# Patient Record
Sex: FEMALE | Race: OTHER | HISPANIC OR LATINO
[De-identification: names, ages, dates, MRNs, and addresses within clinical notes are randomized per-mention and may not be internally consistent; named-entity substitution may affect disease eponyms.]

---

## 2018-09-29 PROBLEM — R70.0 ELEVATED SED RATE: Status: ACTIVE | Noted: 2018-09-29

## 2018-09-29 PROBLEM — Z78.9 SOCIAL ALCOHOL USE: Status: ACTIVE | Noted: 2018-09-29

## 2018-09-29 PROBLEM — R79.82 CRP ELEVATED: Status: ACTIVE | Noted: 2018-09-29

## 2018-09-29 PROBLEM — Z87.42 HISTORY OF OVARIAN CYST: Status: RESOLVED | Noted: 2018-09-29 | Resolved: 2018-09-29

## 2018-10-01 ENCOUNTER — APPOINTMENT (OUTPATIENT)
Dept: HEMATOLOGY ONCOLOGY | Facility: CLINIC | Age: 33
End: 2018-10-01

## 2018-10-01 DIAGNOSIS — Z87.42 PERSONAL HISTORY OF OTHER DISEASES OF THE FEMALE GENITAL TRACT: ICD-10-CM

## 2018-10-01 DIAGNOSIS — Z78.9 OTHER SPECIFIED HEALTH STATUS: ICD-10-CM

## 2018-10-01 DIAGNOSIS — R70.0 ELEVATED ERYTHROCYTE SEDIMENTATION RATE: ICD-10-CM

## 2018-10-01 DIAGNOSIS — R79.82 ELEVATED C-REACTIVE PROTEIN (CRP): ICD-10-CM

## 2018-12-13 ENCOUNTER — RESULT REVIEW (OUTPATIENT)
Age: 33
End: 2018-12-13

## 2019-01-05 ENCOUNTER — APPOINTMENT (OUTPATIENT)
Dept: INTERNAL MEDICINE | Facility: CLINIC | Age: 34
End: 2019-01-05
Payer: COMMERCIAL

## 2019-01-05 VITALS
HEIGHT: 60 IN | DIASTOLIC BLOOD PRESSURE: 60 MMHG | TEMPERATURE: 96.9 F | BODY MASS INDEX: 43.98 KG/M2 | SYSTOLIC BLOOD PRESSURE: 102 MMHG | WEIGHT: 224 LBS

## 2019-01-05 DIAGNOSIS — Z86.39 PERSONAL HISTORY OF OTHER ENDOCRINE, NUTRITIONAL AND METABOLIC DISEASE: ICD-10-CM

## 2019-01-05 PROCEDURE — 96372 THER/PROPH/DIAG INJ SC/IM: CPT

## 2019-01-05 PROCEDURE — 99213 OFFICE O/P EST LOW 20 MIN: CPT | Mod: 25

## 2019-01-05 RX ORDER — CYANOCOBALAMIN 1000 UG/ML
1000 INJECTION INTRAMUSCULAR; SUBCUTANEOUS
Qty: 0 | Refills: 0 | Status: COMPLETED | OUTPATIENT
Start: 2019-01-05

## 2019-01-05 RX ORDER — AMOXICILLIN 875 MG/1
875 TABLET, FILM COATED ORAL
Qty: 14 | Refills: 0 | Status: COMPLETED | COMMUNITY
Start: 2018-09-27

## 2019-01-05 RX ADMIN — CYANOCOBALAMIN 0 MCG/ML: 1000 INJECTION INTRAMUSCULAR; SUBCUTANEOUS at 00:00

## 2019-01-05 NOTE — HISTORY OF PRESENT ILLNESS
[FreeTextEntry8] : Patient is a 33-year-old massively obese female, presenting with upper respiratory tract symptomatology associated with generalized malaise and body aches. Her sense of well-being is heretofore, good. She is receiving B12 shots on an intermittent basis, and requests one today. Given the benignity of the therapy. She is given a B12 shot. Antibiotics were discussed and agreed that the risk outweighed the benefit. In that this is likely to resolve by itself.

## 2019-01-18 ENCOUNTER — APPOINTMENT (OUTPATIENT)
Dept: ENDOCRINOLOGY | Facility: CLINIC | Age: 34
End: 2019-01-18
Payer: COMMERCIAL

## 2019-01-18 VITALS
DIASTOLIC BLOOD PRESSURE: 78 MMHG | WEIGHT: 225 LBS | BODY MASS INDEX: 37.49 KG/M2 | SYSTOLIC BLOOD PRESSURE: 110 MMHG | HEART RATE: 90 BPM | HEIGHT: 65 IN

## 2019-01-18 DIAGNOSIS — Z84.2 FAMILY HISTORY OF OTHER DISEASES OF THE GENITOURINARY SYSTEM: ICD-10-CM

## 2019-01-18 DIAGNOSIS — Z80.0 FAMILY HISTORY OF MALIGNANT NEOPLASM OF DIGESTIVE ORGANS: ICD-10-CM

## 2019-01-18 DIAGNOSIS — Z81.8 FAMILY HISTORY OF OTHER MENTAL AND BEHAVIORAL DISORDERS: ICD-10-CM

## 2019-01-18 DIAGNOSIS — Z82.49 FAMILY HISTORY OF ISCHEMIC HEART DISEASE AND OTHER DISEASES OF THE CIRCULATORY SYSTEM: ICD-10-CM

## 2019-01-18 PROCEDURE — 99213 OFFICE O/P EST LOW 20 MIN: CPT

## 2019-01-18 NOTE — PHYSICAL EXAM
[Alert] : alert [No Acute Distress] : no acute distress [Well Nourished] : well nourished [Well Developed] : well developed [Normal Sclera/Conjunctiva] : normal sclera/conjunctiva [EOMI] : extra ocular movement intact [No Proptosis] : no proptosis [Normal Oropharynx] : the oropharynx was normal [Thyroid Not Enlarged] : the thyroid was not enlarged [No Thyroid Nodules] : there were no palpable thyroid nodules [Normal Rate] : heart rate was normal  [Pedal Pulses Normal] : the pedal pulses are present [No Edema] : there was no peripheral edema [Spine Straight] : spine straight [No Stigmata of Cushings Syndrome] : no stigmata of cushings syndrome [Normal Gait] : normal gait [Acanthosis Nigricans] : no acanthosis nigricans [Normal Reflexes] : deep tendon reflexes were 2+ and symmetric [No Tremors] : no tremors [Oriented x3] : oriented to person, place, and time [Normal Insight/Judgement] : insight and judgment were intact [Normal Affect] : the affect was normal [Normal Mood] : the mood was normal

## 2019-01-18 NOTE — ASSESSMENT
[FreeTextEntry1] : ~\par Currently not hyperthyroid based on recent lab tests at [facility].  However, she notes symptoms so she will repeat labs now and before next visit as well as go for FNAB of thyroid nodule at [place].

## 2019-01-18 NOTE — HISTORY OF PRESENT ILLNESS
[FreeTextEntry1] : January 18, 2019\par \par PCP:  Dr. Cecil Scruggs\par \par 1.  Hyperthyroidism\par      Lab tests at [place] on\par   10/11/2018 included\par    TSH 0.58\par    T3 106   (had been 0.09  on 9/7/2017 at which time TSI was slightly elevated at 144 (0-139) \par \par     Recent [facility] labs showed normal TSH.   \par 2.  Multinodular thyroid.   Went for follow up ultrasound of thyroid at [place]:\par      " CLINICAL HISTORY: Toxic nodular goiter \par  \par  COMPARISON: 9/7/2017 \par  \par  FINDINGS: Thyroid isthmus heterogeneous unchanged measures 3.2 mm. \par  \par  Right lobe of the thyroid mildly heterogeneous measures 5.1 x 1.3 x 1.5 cm and contains a \par hypoechoic nodule in the mid pole 9.7 x 8.5 x 7.9 mm similar to the prior ultrasound. \par  \par  Left lobe of the thyroid measures 4.2 x 1 x 1.5 cm mildly heterogeneous without focal lesion. \par  \par  \par  IMPRESSION: Although there is no significant interval change, ultrasound guided needle \par aspiration/biopsy is again recommended for the hypoechoic nodule in the right mid lobe. \par  \par  \par ***Electronically Signed *** \par ----------------------------------------------- \par Osmani Arreguin MD   "\par \par        Will request that she return to [place] for FNAB after authorization from her insurance company.  \par \par ROV in May. \par \par \par \par     \par \par \par \par \par Previous notes from [employer] appended below.\par \par October 11, 2018\par            .\par            32 yo evaluated by Dr. Umana for multinodular thyroid with hyperthyroidism associated with elevated TSI. FNAB at [facility] [place] reassuring. TSH has returned into normal range.\par            Other issues include elevated BMI\par            .\par            Plan: 1. Continued surveillance of thyroid nodules by means of history, physical and follow up ultrasound.\par            2. Continued monitroing of previous hyperthyroidism apparently due to Grave's disease. .\par            3. Further evaluation of elevated BMI.

## 2019-02-15 ENCOUNTER — RESULT REVIEW (OUTPATIENT)
Age: 34
End: 2019-02-15

## 2019-02-21 ENCOUNTER — APPOINTMENT (OUTPATIENT)
Dept: ENDOCRINOLOGY | Facility: CLINIC | Age: 34
End: 2019-02-21
Payer: COMMERCIAL

## 2019-02-21 PROCEDURE — 99213 OFFICE O/P EST LOW 20 MIN: CPT

## 2019-03-08 ENCOUNTER — APPOINTMENT (OUTPATIENT)
Dept: SURGERY | Facility: CLINIC | Age: 34
End: 2019-03-08
Payer: COMMERCIAL

## 2019-03-08 VITALS
WEIGHT: 218 LBS | DIASTOLIC BLOOD PRESSURE: 88 MMHG | HEART RATE: 77 BPM | SYSTOLIC BLOOD PRESSURE: 151 MMHG | BODY MASS INDEX: 42.8 KG/M2 | RESPIRATION RATE: 19 BRPM | TEMPERATURE: 98.4 F | OXYGEN SATURATION: 99 % | HEIGHT: 60 IN

## 2019-03-08 DIAGNOSIS — Z78.9 OTHER SPECIFIED HEALTH STATUS: ICD-10-CM

## 2019-03-08 PROCEDURE — 99204 OFFICE O/P NEW MOD 45 MIN: CPT

## 2019-03-10 PROBLEM — Z78.9 EXERCISES OCCASIONALLY: Status: ACTIVE | Noted: 2019-03-08

## 2019-03-10 PROBLEM — Z78.9 NON-SMOKER: Status: ACTIVE | Noted: 2019-03-08

## 2019-03-13 ENCOUNTER — RECORD ABSTRACTING (OUTPATIENT)
Age: 34
End: 2019-03-13

## 2019-03-13 DIAGNOSIS — R79.89 OTHER SPECIFIED ABNORMAL FINDINGS OF BLOOD CHEMISTRY: ICD-10-CM

## 2019-03-13 DIAGNOSIS — E05.20 THYROTOXICOSIS WITH TOXIC MULTINODULAR GOITER W/OUT THYROTOXIC CRISIS OR STORM: ICD-10-CM

## 2019-03-13 LAB — CYTOLOGY CVX/VAG DOC THIN PREP: NORMAL

## 2019-03-14 NOTE — HISTORY OF PRESENT ILLNESS
[de-identified] : thyroid nodule noted on routine examination. denies dysphagia, hoarseness or RT exposure.  sonogram shows 9.7 mm  right thyroid  nodule.   FNA positive for papillary carcinoma. normal TFT's

## 2019-03-14 NOTE — PHYSICAL EXAM
[Laryngoscopy Performed] : laryngoscopy was performed, see procedure section for findings [Midline] : located in midline position [Normal] : orientation to person, place, and time: normal [de-identified] : 1 cm right thyroid nodule, well circumscribed and mobile [de-identified] : laryngoscopy shows normal vocal cord mobility bilaterally with no lesions noted

## 2019-03-14 NOTE — CONSULT LETTER
[Dear  ___] : Dear  [unfilled], [Consult Letter:] : I had the pleasure of evaluating your patient, [unfilled]. [Please see my note below.] : Please see my note below. [Consult Closing:] : Thank you very much for allowing me to participate in the care of this patient.  If you have any questions, please do not hesitate to contact me. [Sincerely,] : Sincerely, [FreeTextEntry2] : Dr. James Hellerman, Dr. Heber De La Rosa [FreeTextEntry3] : Yogi Perez MD, FACS\par System Director, Endocrine Surgery\par [facility]\par  [DrSuad  ___] : Dr. PANIAGUA

## 2019-03-14 NOTE — ASSESSMENT
[FreeTextEntry1] : lengthy discussion regarding options for management including active surveillance within a formal study vs thyroid lobectomy with paratracheal node dissection, with or without frozen section vs total thyroidectomy with paratracheal node dissection. risks, benefits and alternatives discussed at length.  wishes to proceed with lobectomy. to be scheduled at [place]

## 2019-04-04 ENCOUNTER — APPOINTMENT (OUTPATIENT)
Dept: INTERNAL MEDICINE | Facility: CLINIC | Age: 34
End: 2019-04-04
Payer: COMMERCIAL

## 2019-04-04 VITALS
DIASTOLIC BLOOD PRESSURE: 70 MMHG | TEMPERATURE: 98.8 F | SYSTOLIC BLOOD PRESSURE: 108 MMHG | BODY MASS INDEX: 44.17 KG/M2 | HEIGHT: 60 IN | WEIGHT: 225 LBS

## 2019-04-04 PROCEDURE — 93000 ELECTROCARDIOGRAM COMPLETE: CPT

## 2019-04-04 PROCEDURE — 99214 OFFICE O/P EST MOD 30 MIN: CPT | Mod: 25

## 2019-04-04 PROCEDURE — 36415 COLL VENOUS BLD VENIPUNCTURE: CPT

## 2019-04-04 RX ORDER — FOLIC ACID 1 MG/1
1 TABLET ORAL DAILY
Refills: 0 | Status: DISCONTINUED | COMMUNITY
End: 2019-04-04

## 2019-04-04 RX ORDER — FLUTICASONE PROPIONATE 50 UG/1
50 SPRAY, METERED NASAL TWICE DAILY
Refills: 0 | Status: DISCONTINUED | COMMUNITY
End: 2019-04-04

## 2019-04-04 RX ORDER — SUMATRIPTAN 50 MG/1
50 TABLET, FILM COATED ORAL
Refills: 0 | Status: DISCONTINUED | COMMUNITY
End: 2019-04-04

## 2019-04-04 RX ORDER — NEOMYCIN SULFATE, POLYMYXIN B SULFATE AND DEXAMETHASONE 3.5; 10000; 1 MG/ML; [USP'U]/ML; MG/ML
3.5-10000-0.1 SUSPENSION OPHTHALMIC
Refills: 0 | Status: DISCONTINUED | COMMUNITY
End: 2019-04-04

## 2019-04-04 RX ORDER — CYANOCOBALAMIN (VITAMIN B-12) 1000MCG/ML
1000 VIAL (ML) INJECTION
Refills: 0 | Status: DISCONTINUED | COMMUNITY
End: 2019-04-04

## 2019-04-04 RX ORDER — ERGOCALCIFEROL 1.25 MG/1
1.25 MG CAPSULE ORAL
Refills: 0 | Status: DISCONTINUED | COMMUNITY
End: 2019-04-04

## 2019-04-04 RX ORDER — IBUPROFEN 800 MG/1
800 TABLET, FILM COATED ORAL
Qty: 24 | Refills: 0 | Status: DISCONTINUED | COMMUNITY
Start: 2018-09-27 | End: 2019-04-04

## 2019-04-04 RX ORDER — METOPROLOL SUCCINATE 25 MG/1
25 TABLET, EXTENDED RELEASE ORAL DAILY
Refills: 0 | Status: DISCONTINUED | COMMUNITY
End: 2019-04-04

## 2019-04-04 RX ORDER — LEVOCETIRIZINE DIHYDROCHLORIDE 5 MG/1
5 TABLET ORAL DAILY
Refills: 0 | Status: DISCONTINUED | COMMUNITY
End: 2019-04-04

## 2019-04-05 ENCOUNTER — NON-APPOINTMENT (OUTPATIENT)
Age: 34
End: 2019-04-05

## 2019-04-05 LAB
ALBUMIN SERPL ELPH-MCNC: 3.6 G/DL
ALP BLD-CCNC: 77 U/L
ALT SERPL-CCNC: 17 U/L
ANION GAP SERPL CALC-SCNC: 11 MMOL/L
APPEARANCE: CLEAR
APTT BLD: 29.1 SEC
AST SERPL-CCNC: 10 U/L
BASOPHILS # BLD AUTO: 0.03 K/UL
BASOPHILS NFR BLD AUTO: 0.3 %
BILIRUB SERPL-MCNC: 0.4 MG/DL
BILIRUBIN URINE: NEGATIVE
BLOOD URINE: NEGATIVE
BUN SERPL-MCNC: 13 MG/DL
CALCIUM SERPL-MCNC: 9 MG/DL
CHLORIDE SERPL-SCNC: 105 MMOL/L
CO2 SERPL-SCNC: 23 MMOL/L
COLOR: NORMAL
CREAT SERPL-MCNC: 0.84 MG/DL
EOSINOPHIL # BLD AUTO: 0.27 K/UL
EOSINOPHIL NFR BLD AUTO: 2.3 %
GLUCOSE QUALITATIVE U: NEGATIVE
GLUCOSE SERPL-MCNC: 88 MG/DL
HCT VFR BLD CALC: 40 %
HGB BLD-MCNC: 12.3 G/DL
IMM GRANULOCYTES NFR BLD AUTO: 0.8 %
INR PPP: 0.91 RATIO
KETONES URINE: NEGATIVE
LEUKOCYTE ESTERASE URINE: NEGATIVE
LYMPHOCYTES # BLD AUTO: 3.22 K/UL
LYMPHOCYTES NFR BLD AUTO: 27.2 %
MAN DIFF?: NORMAL
MCHC RBC-ENTMCNC: 26.1 PG
MCHC RBC-ENTMCNC: 30.8 GM/DL
MCV RBC AUTO: 84.7 FL
MONOCYTES # BLD AUTO: 0.66 K/UL
MONOCYTES NFR BLD AUTO: 5.6 %
NEUTROPHILS # BLD AUTO: 7.55 K/UL
NEUTROPHILS NFR BLD AUTO: 63.8 %
NITRITE URINE: NEGATIVE
PH URINE: 5.5
PLATELET # BLD AUTO: 340 K/UL
POTASSIUM SERPL-SCNC: 4.3 MMOL/L
PROT SERPL-MCNC: 6.8 G/DL
PROTEIN URINE: NEGATIVE
PT BLD: 10.3 SEC
RBC # BLD: 4.72 M/UL
RBC # FLD: 14.9 %
SODIUM SERPL-SCNC: 139 MMOL/L
SPECIFIC GRAVITY URINE: 1.02
UROBILINOGEN URINE: NORMAL
WBC # FLD AUTO: 11.82 K/UL

## 2019-04-05 NOTE — COUNSELING
[Weight management counseling provided] : Weight management [Healthy eating counseling provided] : healthy eating [Activity counseling provided] : activity [Decrease Portions] : Decrease food portions [Walking] : Walking

## 2019-04-06 LAB — BACTERIA UR CULT: NORMAL

## 2019-04-06 NOTE — PLAN
[FreeTextEntry1] : 34 y/o female with malignant nodule right lobe of thyroid-scheduled for right thyroid lobectomy\par \par Denies any cardiovascular symptoms i.e. chest pain, shortness of breath, dizziness, palpitations. Also denies any asthma symptoms in recent past, has not used inhaler for years.

## 2019-04-06 NOTE — REVIEW OF SYSTEMS
[Negative] : Heme/Lymph [Anxiety] : anxiety [Suicidal] : not suicidal [Depression] : no depression [de-identified] : Anxious about diagnosis and surgery

## 2019-04-06 NOTE — ASSESSMENT
[Patient Optimized for Surgery] : Patient optimized for surgery [No Further Testing Recommended] : no further testing recommended [As per surgery] : as per surgery [FreeTextEntry4] : MEDICALLY CLEARED FOR PROPOSED SURGERY

## 2019-04-06 NOTE — PHYSICAL EXAM
[Normal] : normal gait, coordination grossly intact, no focal deficits [Obese] : patient was observed to be obese [Normal Female:] : bladder was normal on palpation [de-identified] : short, thick neck [de-identified] : deferred [FreeTextEntry1] : deferred [de-identified] : no lymphadenopathy [de-identified] : denies excessive thirst, urination, fatigue

## 2019-04-06 NOTE — HISTORY OF PRESENT ILLNESS
[No Pertinent Cardiac History] : no history of aortic stenosis, atrial fibrillation, coronary artery disease, recent myocardial infarction, or implantable device/pacemaker [Asthma] : asthma [No Adverse Anesthesia Reaction] : no adverse anesthesia reaction in self or family member [(Patient denies any chest pain, claudication, dyspnea on exertion, orthopnea, palpitations or syncope)] : Patient denies any chest pain, claudication, dyspnea on exertion, orthopnea, palpitations or syncope [Moderate (4-6 METs)] : Moderate (4-6 METs) [Chronic Anticoagulation] : no chronic anticoagulation [Chronic Kidney Disease] : no chronic kidney disease [Diabetes] : no diabetes [FreeTextEntry1] : Right Thyroid Lobectomy [FreeTextEntry2] : 4/11/19 @ [facility] [FreeTextEntry3] : Dr Yogi Perez [FreeTextEntry4] : Malignant neoplasm right lobe of thyroid - Scheduled for right thyroid lobectomy with paratracheal node dissection

## 2019-04-06 NOTE — RESULTS/DATA
[] : results reviewed [de-identified] : Sinus  Rhythm 73\par -RSR(V1) -nondiagnostic. \par  -Horizontal axis for age. \par BORDERLINE

## 2019-04-11 ENCOUNTER — OTHER (OUTPATIENT)
Age: 34
End: 2019-04-11

## 2019-04-11 ENCOUNTER — APPOINTMENT (OUTPATIENT)
Dept: SURGERY | Facility: HOSPITAL | Age: 34
End: 2019-04-11
Payer: COMMERCIAL

## 2019-04-11 PROCEDURE — 13132 CMPLX RPR F/C/C/M/N/AX/G/H/F: CPT | Mod: 59

## 2019-04-11 PROCEDURE — 60252 REMOVAL OF THYROID: CPT

## 2019-04-18 ENCOUNTER — APPOINTMENT (OUTPATIENT)
Dept: INTERNAL MEDICINE | Facility: CLINIC | Age: 34
End: 2019-04-18
Payer: COMMERCIAL

## 2019-04-18 VITALS
SYSTOLIC BLOOD PRESSURE: 138 MMHG | TEMPERATURE: 98.7 F | DIASTOLIC BLOOD PRESSURE: 86 MMHG | WEIGHT: 216 LBS | HEIGHT: 60 IN | BODY MASS INDEX: 42.41 KG/M2

## 2019-04-18 DIAGNOSIS — J06.9 ACUTE UPPER RESPIRATORY INFECTION, UNSPECIFIED: ICD-10-CM

## 2019-04-18 DIAGNOSIS — H65.192 OTHER ACUTE NONSUPPURATIVE OTITIS MEDIA, LEFT EAR: ICD-10-CM

## 2019-04-18 PROCEDURE — 99213 OFFICE O/P EST LOW 20 MIN: CPT

## 2019-04-18 NOTE — PHYSICAL EXAM
[No Acute Distress] : no acute distress [Well Nourished] : well nourished [Well Developed] : well developed [Well-Appearing] : well-appearing [Normal Sclera/Conjunctiva] : normal sclera/conjunctiva [PERRL] : pupils equal round and reactive to light [EOMI] : extraocular movements intact [Normal Outer Ear/Nose] : the outer ears and nose were normal in appearance [Normal Oropharynx] : the oropharynx was normal [Supple] : supple [No JVD] : no jugular venous distention [No Lymphadenopathy] : no lymphadenopathy [No Respiratory Distress] : no respiratory distress  [Thyroid Normal, No Nodules] : the thyroid was normal and there were no nodules present [Clear to Auscultation] : lungs were clear to auscultation bilaterally [No Accessory Muscle Use] : no accessory muscle use [Normal Rate] : normal rate  [Regular Rhythm] : with a regular rhythm [No Murmur] : no murmur heard [Normal S1, S2] : normal S1 and S2 [No Abdominal Bruit] : a ~M bruit was not heard ~T in the abdomen [No Carotid Bruits] : no carotid bruits [No Varicosities] : no varicosities [Pedal Pulses Present] : the pedal pulses are present [No Extremity Clubbing/Cyanosis] : no extremity clubbing/cyanosis [No Edema] : there was no peripheral edema [No Palpable Aorta] : no palpable aorta [Soft] : abdomen soft [Non-distended] : non-distended [Non Tender] : non-tender [No HSM] : no HSM [No Masses] : no abdominal mass palpated [Normal Bowel Sounds] : normal bowel sounds [Normal Posterior Cervical Nodes] : no posterior cervical lymphadenopathy [Normal Anterior Cervical Nodes] : no anterior cervical lymphadenopathy [No CVA Tenderness] : no CVA  tenderness [No Spinal Tenderness] : no spinal tenderness [Grossly Normal Strength/Tone] : grossly normal strength/tone [No Joint Swelling] : no joint swelling [No Rash] : no rash [Normal Gait] : normal gait [Coordination Grossly Intact] : coordination grossly intact [No Focal Deficits] : no focal deficits [Normal Affect] : the affect was normal [Deep Tendon Reflexes (DTR)] : deep tendon reflexes were 2+ and symmetric [Normal Insight/Judgement] : insight and judgment were intact [de-identified] : Left TM erythmatic, right ear canal cerumen impaction

## 2019-04-18 NOTE — HISTORY OF PRESENT ILLNESS
[FreeTextEntry8] : 33-year-old female status post thyroid lobectomy approximately one week ago presents complaining of stuffy head, left ear pain and cough.\par \par Suture line well approximated, healing well, no drainage or erythema

## 2019-04-18 NOTE — REVIEW OF SYSTEMS
[Earache] : earache [Nasal Discharge] : nasal discharge [Postnasal Drip] : postnasal drip [Cough] : cough [Negative] : Heme/Lymph [Shortness Of Breath] : no shortness of breath [Wheezing] : no wheezing [FreeTextEntry4] : left ear pain

## 2019-04-18 NOTE — PLAN
[FreeTextEntry1] : 33-year-old female presents with complaint of ear pain, upper respiratory infection symptoms, status post thyroid lobectomy one week ago. Denies fever chills. Advise Debrox for right ear and prescribed Zithromax if no improvement or symptoms persist return to office

## 2019-04-18 NOTE — COUNSELING
[Healthy eating counseling provided] : healthy eating [Weight management counseling provided] : Weight management [Activity counseling provided] : activity [Good understanding] : Patient has a good understanding of disease, goals and obesity follow-up plan

## 2019-04-20 NOTE — PHYSICAL EXAM
[Alert] : alert [No Acute Distress] : no acute distress [Well Nourished] : well nourished [Well Developed] : well developed [PERRL] : pupils equal, round and reactive to light [EOMI] : extra ocular movement intact [Normal Outer Ear/Nose] : the ears and nose were normal in appearance [No Neck Mass] : no neck mass was observed [Thyroid Not Enlarged] : the thyroid was not enlarged [Normal Rate and Effort] : normal respiratory rhythm and effort [No Respiratory Distress] : no respiratory distress [No Stigmata of Cushings Syndrome] : no stigmata of cushings syndrome [Normal Rate] : heart rate was normal  [Normal Insight/Judgement] : insight and judgment were intact [Oriented x3] : oriented to person, place, and time

## 2019-04-20 NOTE — HISTORY OF PRESENT ILLNESS
[FreeTextEntry1] : February 21, 2019\par \par PCP:  Dr. Cecil Scruggs/Juanita Guaman\par \par CC:  R thyroid nodule  (?hot on scan)\par         Low TSH\par         Elevated BMI\par \par In course of evaluation for elevated BMI, TFTs obtained and TSH was low, TSI slightly elevated,\par ultrasound thyroid showed ~1 cm R thyroid nodule, thyroid scan interpreted as likely "hot" nodule\par with some suppression of rest of gland.    \par TSH went back to normal.\par Follow up ultrasound advised FNAB which is read as positive for papillary thyroid cancer.\par She will meet with Dr. Perez for his advice in the near future.  \par         \par         \par \par \par \par \par January 18, 2019\par \par PCP:  Dr. Cecil Scruggs\par \par 1.  Hyperthyroidism\par      Lab tests at [place] on\par   10/11/2018 included\par    TSH 0.58\par    T3 106   (had been 0.09  on 9/7/2017 at which time TSI was slightly elevated at 144 (0-139) \par \par     Recent [facility] labs showed normal TSH.   \par 2.  Multinodular thyroid.   Went for follow up ultrasound of thyroid at [place]:\par      " CLINICAL HISTORY: Toxic nodular goiter \par  \par  COMPARISON: 9/7/2017 \par  \par  FINDINGS: Thyroid isthmus heterogeneous unchanged measures 3.2 mm. \par  \par  Right lobe of the thyroid mildly heterogeneous measures 5.1 x 1.3 x 1.5 cm and contains a \par hypoechoic nodule in the mid pole 9.7 x 8.5 x 7.9 mm similar to the prior ultrasound. \par  \par  Left lobe of the thyroid measures 4.2 x 1 x 1.5 cm mildly heterogeneous without focal lesion. \par  \par  \par  IMPRESSION: Although there is no significant interval change, ultrasound guided needle \par aspiration/biopsy is again recommended for the hypoechoic nodule in the right mid lobe. \par  \par  \par ***Electronically Signed *** \par ----------------------------------------------- \par Osmani Arreguin MD   "\par \par        Will request that she return to [place] for FNAB after authorization from her insurance company.  \par \par ROV in May. \par \par \par \par     \par \par \par \par \par Previous notes from [employer] appended below.\par \par October 11, 2018\par            .\par            34 yo evaluated by Dr. Umana for multinodular thyroid with hyperthyroidism associated with elevated TSI. FNAB at [facility] reassuring. TSH has returned into normal range.\par            Other issues include elevated BMI\par            .\par            Plan: 1. Continued surveillance of thyroid nodules by means of history, physical and follow up ultrasound.\par            2. Continued monitroing of previous hyperthyroidism apparently due to Grave's disease. .\par            3. Further evaluation of elevated BMI.

## 2019-04-26 ENCOUNTER — APPOINTMENT (OUTPATIENT)
Dept: SURGERY | Facility: CLINIC | Age: 34
End: 2019-04-26
Payer: COMMERCIAL

## 2019-04-26 VITALS
DIASTOLIC BLOOD PRESSURE: 84 MMHG | SYSTOLIC BLOOD PRESSURE: 134 MMHG | RESPIRATION RATE: 19 BRPM | HEIGHT: 60 IN | OXYGEN SATURATION: 98 % | BODY MASS INDEX: 43 KG/M2 | WEIGHT: 219 LBS | TEMPERATURE: 98.1 F | HEART RATE: 79 BPM

## 2019-04-26 PROCEDURE — 99024 POSTOP FOLLOW-UP VISIT: CPT

## 2019-04-26 NOTE — ASSESSMENT
[FreeTextEntry1] : pathology report discussed. no further intervention necessary.  instructed in maneuvers to minimize scarring. bloods in 2-3 weeks.  to call next week for results. to return earlier if any change

## 2019-04-26 NOTE — PHYSICAL EXAM
[de-identified] : minimal scar swelling.  healing well [de-identified] : no palpable thyroid nodules [Laryngoscopy Performed] : laryngoscopy was performed, see procedure section for findings [Midline] : located in midline position [Normal] : cranial nerves 2-12 intact

## 2019-04-26 NOTE — HISTORY OF PRESENT ILLNESS
[de-identified] : 2 weeks s/p thyroid lobectomy for micro cancer (1 cm) .  denies dysphagia, hoarseness .  had URI 1 week postop.   notes slight fatigue and constipation

## 2019-05-17 ENCOUNTER — APPOINTMENT (OUTPATIENT)
Dept: ENDOCRINOLOGY | Facility: CLINIC | Age: 34
End: 2019-05-17
Payer: COMMERCIAL

## 2019-05-17 ENCOUNTER — LABORATORY RESULT (OUTPATIENT)
Age: 34
End: 2019-05-17

## 2019-05-17 VITALS
BODY MASS INDEX: 40.48 KG/M2 | WEIGHT: 220 LBS | DIASTOLIC BLOOD PRESSURE: 80 MMHG | SYSTOLIC BLOOD PRESSURE: 118 MMHG | HEIGHT: 62 IN | HEART RATE: 86 BPM

## 2019-05-17 PROCEDURE — 36415 COLL VENOUS BLD VENIPUNCTURE: CPT

## 2019-05-17 PROCEDURE — 99213 OFFICE O/P EST LOW 20 MIN: CPT | Mod: 25

## 2019-05-17 RX ORDER — AZITHROMYCIN 500 MG/1
500 TABLET, FILM COATED ORAL DAILY
Qty: 1 | Refills: 0 | Status: DISCONTINUED | COMMUNITY
Start: 2019-04-18 | End: 2019-05-17

## 2019-05-17 RX ORDER — ASPIRIN 81 MG
6.5 TABLET, DELAYED RELEASE (ENTERIC COATED) ORAL
Qty: 1 | Refills: 0 | Status: DISCONTINUED | COMMUNITY
Start: 2019-04-18 | End: 2019-05-17

## 2019-05-20 ENCOUNTER — OTHER (OUTPATIENT)
Age: 34
End: 2019-05-20

## 2019-05-27 NOTE — PHYSICAL EXAM
[Alert] : alert [No Acute Distress] : no acute distress [No Proptosis] : no proptosis [Well Nourished] : well nourished [Well Developed] : well developed [Normal Outer Ear/Nose] : the ears and nose were normal in appearance [No Neck Mass] : no neck mass was observed [Normal Insight/Judgement] : insight and judgment were intact [Oriented x3] : oriented to person, place, and time [Normal Affect] : the affect was normal [Normal Mood] : the mood was normal

## 2019-05-27 NOTE — HISTORY OF PRESENT ILLNESS
[FreeTextEntry1] : May 17, 2019\par PCP:  Dr. Cecil Scruggs/Juanita Guaman\par \par CC:  R thyroid nodule  (?hot on scan)\par         Previously low TSH\par         Elevated BMI\par \par Underwent surgical removal of isthmus and R thyroid lobe for 10 mm papillary thyroid carcinoma, follicular variant, infiltrative.\par Feels well.\par Wants to lose weight.\par Has f/u appt to see Dr. Perez end of May.\par \par Plan:  TFTs, Tg today.\par ROV in August.\par \par \par February 21, 2019\par \par PCP:  Dr. Cecil Scruggs/Juanita Guaman\par \par CC:  R thyroid nodule  (?hot on scan)\par         Low TSH\par         Elevated BMI\par \par In course of evaluation for elevated BMI, TFTs obtained and TSH was low, TSI slightly elevated,\par ultrasound thyroid showed ~1 cm R thyroid nodule, thyroid scan interpreted as likely "hot" nodule\par with some suppression of rest of gland.    \par TSH went back to normal.\par Follow up ultrasound advised FNAB which is read as positive for papillary thyroid cancer.\par She will meet with Dr. Perez for his advice in the near future.  \par         \par         \par \par \par \par \par January 18, 2019\par \par PCP:  Dr. Cecil Scruggs\par \par 1.  Hyperthyroidism\par      Lab tests at [place] on\par   10/11/2018 included\par    TSH 0.58\par    T3 106   (had been 0.09  on 9/7/2017 at which time TSI was slightly elevated at 144 (0-139) \par \par     Recent [facility] labs showed normal TSH.   \par 2.  Multinodular thyroid.   Went for follow up ultrasound of thyroid at [place]:\par      " CLINICAL HISTORY: Toxic nodular goiter \par  \par  COMPARISON: 9/7/2017 \par  \par  FINDINGS: Thyroid isthmus heterogeneous unchanged measures 3.2 mm. \par  \par  Right lobe of the thyroid mildly heterogeneous measures 5.1 x 1.3 x 1.5 cm and contains a \par hypoechoic nodule in the mid pole 9.7 x 8.5 x 7.9 mm similar to the prior ultrasound. \par  \par  Left lobe of the thyroid measures 4.2 x 1 x 1.5 cm mildly heterogeneous without focal lesion. \par  \par  \par  IMPRESSION: Although there is no significant interval change, ultrasound guided needle \par aspiration/biopsy is again recommended for the hypoechoic nodule in the right mid lobe. \par  \par  \par ***Electronically Signed *** \par ----------------------------------------------- \par Osmani Arreguin MD   "\par \par        Will request that she return to [place] for FNAB after authorization from her insurance company.  \par \par ROV in May. \par \par \par \par     \par \par \par \par \par Previous notes from [employer] appended below.\par \par October 11, 2018\par            .\par            34 yo evaluated by Dr. Umana for multinodular thyroid with hyperthyroidism associated with elevated TSI. FNAB at [facility] reassuring. TSH has returned into normal range.\par            Other issues include elevated BMI\par            .\par            Plan: 1. Continued surveillance of thyroid nodules by means of history, physical and follow up ultrasound.\par            2. Continued monitroing of previous hyperthyroidism apparently due to Grave's disease. .\par            3. Further evaluation of elevated BMI.

## 2019-05-27 NOTE — ASSESSMENT
[FreeTextEntry1] : &\par 1 cm papillary thyroid cancer \par Likely that surveillance will be the consensus approach for now.   \par ROV August.

## 2019-06-03 ENCOUNTER — APPOINTMENT (OUTPATIENT)
Dept: INTERNAL MEDICINE | Facility: CLINIC | Age: 34
End: 2019-06-03
Payer: COMMERCIAL

## 2019-06-03 VITALS
HEIGHT: 61 IN | WEIGHT: 224 LBS | DIASTOLIC BLOOD PRESSURE: 80 MMHG | BODY MASS INDEX: 42.29 KG/M2 | SYSTOLIC BLOOD PRESSURE: 118 MMHG

## 2019-06-03 DIAGNOSIS — Z11.1 ENCOUNTER FOR SCREENING FOR RESPIRATORY TUBERCULOSIS: ICD-10-CM

## 2019-06-03 PROCEDURE — 86580 TB INTRADERMAL TEST: CPT

## 2019-06-03 PROCEDURE — 99213 OFFICE O/P EST LOW 20 MIN: CPT | Mod: 25

## 2019-06-03 PROCEDURE — 36415 COLL VENOUS BLD VENIPUNCTURE: CPT

## 2019-06-03 NOTE — PLAN
[FreeTextEntry1] : 33-year-old female with complaint of nonproductive cough for about a week otherwise feels very well. Needs paperwork filled out for work including titers and PPD. Lab work and EKG done in April as part of a preop clearance\par \par Cough most likely due to reactive airway/seasonal allergies. Advised lozenges, saline nasal spray. If symptoms increase or persist return to office

## 2019-06-03 NOTE — HISTORY OF PRESENT ILLNESS
[FreeTextEntry1] : Followup hypothyroid, PPD [de-identified] : 33-year-old female presents for followup, needs paperwork filled out for work including titers and PPD. Complaining of a dry annoying cough for the past week otherwise feels well, no fever chills

## 2019-06-03 NOTE — PHYSICAL EXAM

## 2019-06-04 LAB
MEV IGG FLD QL IA: >300 AU/ML
MEV IGG+IGM SER-IMP: POSITIVE
MUV AB SER-ACNC: POSITIVE
MUV IGG SER QL IA: 117 AU/ML
RUBV IGG FLD-ACNC: 1.7 INDEX
RUBV IGG SER-IMP: POSITIVE
VZV AB TITR SER: POSITIVE
VZV IGG SER IF-ACNC: 1383 INDEX

## 2019-06-21 ENCOUNTER — MED ADMIN CHARGE (OUTPATIENT)
Age: 34
End: 2019-06-21

## 2019-06-23 LAB
ANION GAP SERPL CALC-SCNC: 14 MMOL/L
BUN SERPL-MCNC: 13 MG/DL
CALCIUM SERPL-MCNC: 9.6 MG/DL
CHLORIDE SERPL-SCNC: 102 MMOL/L
CO2 SERPL-SCNC: 24 MMOL/L
CORTIS SERPL-MCNC: 14.3 UG/DL
CREAT SERPL-MCNC: 0.96 MG/DL
GLUCOSE SERPL-MCNC: 93 MG/DL
POTASSIUM SERPL-SCNC: 4.7 MMOL/L
SODIUM SERPL-SCNC: 139 MMOL/L
T3 SERPL-MCNC: 98 NG/DL
T3RU NFR SERPL: 1 TBI
T4 SERPL-MCNC: 7.4 UG/DL
THYROGLOB AB SERPL IA-ACNC: <1.8 IU/ML
TSH SERPL-ACNC: 3.39 UIU/ML

## 2019-07-03 ENCOUNTER — APPOINTMENT (OUTPATIENT)
Dept: INTERNAL MEDICINE | Facility: CLINIC | Age: 34
End: 2019-07-03

## 2019-08-08 ENCOUNTER — INBOUND DOCUMENT (OUTPATIENT)
Age: 34
End: 2019-08-08

## 2019-09-11 ENCOUNTER — APPOINTMENT (OUTPATIENT)
Dept: ENDOCRINOLOGY | Facility: CLINIC | Age: 34
End: 2019-09-11
Payer: MEDICAID

## 2019-09-11 ENCOUNTER — LABORATORY RESULT (OUTPATIENT)
Age: 34
End: 2019-09-11

## 2019-09-11 VITALS
DIASTOLIC BLOOD PRESSURE: 76 MMHG | BODY MASS INDEX: 41.91 KG/M2 | WEIGHT: 222 LBS | SYSTOLIC BLOOD PRESSURE: 118 MMHG | HEART RATE: 92 BPM | HEIGHT: 61 IN

## 2019-09-11 PROCEDURE — 36415 COLL VENOUS BLD VENIPUNCTURE: CPT

## 2019-09-11 PROCEDURE — 99214 OFFICE O/P EST MOD 30 MIN: CPT | Mod: 25

## 2019-09-11 NOTE — ASSESSMENT
[FreeTextEntry1] : Small papillary thyroid cancer, exised.\par Will monitor by means of ultrasound, exam.\par Will include thyroglobulin although not likely to be very helpful.

## 2019-09-11 NOTE — PHYSICAL EXAM
[Alert] : alert [No Acute Distress] : no acute distress [Well Nourished] : well nourished [Well Developed] : well developed [Normal Voice/Communication] : normal voice communication [Healthy Appearance] : healthy appearance [No Proptosis] : no proptosis [No Neck Mass] : no neck mass was observed [Thyroid Not Enlarged] : the thyroid was not enlarged [Well Healed Scar] : well healed scar [No Respiratory Distress] : no respiratory distress [Normal Rate and Effort] : normal respiratory rhythm and effort [Normal Rate] : heart rate was normal  [No Accessory Muscle Use] : no accessory muscle use [Regular Rhythm] : with a regular rhythm [No Edema] : there was no peripheral edema [No Varicosities] : there were no varicosital changes [No Stigmata of Cushings Syndrome] : no stigmata of cushings syndrome [Oriented x3] : oriented to person, place, and time [Normal Insight/Judgement] : insight and judgment were intact [Normal Affect] : the affect was normal [Normal Mood] : the mood was normal

## 2019-09-11 NOTE — HISTORY OF PRESENT ILLNESS
[FreeTextEntry1] : September 11, 2019\par PCP:  Dr. Cecil Scruggs/Juanita Guaman\par \par CC:  R thyroid nodule  (?hot on scan)->  4/11/19 Dr. Perez:  10 mm papillary thyroid carcinoma, follicular variant, infiltrative.\par         Previously low TSH\par         Elevated BMI\par \par \par FINAL DIAGNOSIS\par  \par Thyroid, right lobe and isthmus, lobectomy:\par 	PAPILLARY THYROID CARCINOMA, FOLLICULAR VARIANT,  INFILTRATIVE.\par 	Tumor size: 10 mm\par 	All margins are uninvolved by tumor; closest margin is anterior (0.5 mm), at site \par 	   of prior biopsy.\par 	One unremarkable parathyroid gland.\par 	No lymph nodes are identified.\par 	Pathologic Stage: pT1a NX.\par See Synoptic Report.\par \par Synoptic Report (Cancer Protocol of  February, 2019, of the College of \par 	American [employer]ologists):\par Procedure: Right lobectomy\par 	Tumor focality: Unifocal\par 	Tumor site: Right lobe\par 	Tumor size, greatest dimension: 10 mm \par 	Histologic type: Papillary carcinoma, follicular variant, infiltrative.\par 	Margins: Uninvolved by carcinoma.\par 		   Distance from closest margin: 0.5  mm from the inked and cauterized \par 		       anterior margin, at the site of prior biopsy, which there are reactive \par 	 	       changes.\par 	Angioinvasion: Not identified\par 	Lymphatic invasion: Not identified.\par 	Perineural invasion: Not identified.\par 	Mitotic rate:   Less than 1 per 2 square millimeters\par 	Extrathyroidal extension: Not identified.\par 	Regional lymph nodes: No lymph nodes submitted or found.\par 	Pathologic Stage Classification: pT1a NX\par \par \par Lobectomy on\par Labs at [facility] on\par 5/17 iincluded\par Tg 13\par TSH 3.39\par \par Plan:  Labs today.\par US and\par ROV January\par \par \par \par \par May 17, 2019\par PCP:  Dr. Cecil Scruggs/Juanita Guaman\par \par CC:  R thyroid nodule  (?hot on scan)\par         Previously low TSH\par         Elevated BMI\par \par Underwent surgical removal of isthmus and R thyroid lobe for 10 mm papillary thyroid carcinoma, follicular variant, infiltrative.\par Feels well.\par Wants to lose weight.\par Has f/u appt to see Dr. Perez end of May.\par \par Plan:  TFTs, Tg today.\par ROV in August.\par \par \par February 21, 2019\par \par PCP:  Dr. Cecil Scruggs/Juanita Guaman\par \par CC:  R thyroid nodule  (?hot on scan)\par         Low TSH\par         Elevated BMI\par \par In course of evaluation for elevated BMI, TFTs obtained and TSH was low, TSI slightly elevated,\par ultrasound thyroid showed ~1 cm R thyroid nodule, thyroid scan interpreted as likely "hot" nodule\par with some suppression of rest of gland.    \par TSH went back to normal.\par Follow up ultrasound advised FNAB which is read as positive for papillary thyroid cancer.\par She will meet with Dr. Perez for his advice in the near future.  \par         \par         \par \par \par \par \par January 18, 2019\par \par PCP:  Dr. Cecil Scruggs\par \par 1.  Hyperthyroidism\par      Lab tests at [place] on\par   10/11/2018 included\par    TSH 0.58\par    T3 106   (had been 0.09  on 9/7/2017 at which time TSI was slightly elevated at 144 (0-139) \par \par     Recent [facility] labs showed normal TSH.   \par 2.  Multinodular thyroid.   Went for follow up ultrasound of thyroid at [place]:\par      " CLINICAL HISTORY: Toxic nodular goiter \par  \par  COMPARISON: 9/7/2017 \par  \par  FINDINGS: Thyroid isthmus heterogeneous unchanged measures 3.2 mm. \par  \par  Right lobe of the thyroid mildly heterogeneous measures 5.1 x 1.3 x 1.5 cm and contains a \par hypoechoic nodule in the mid pole 9.7 x 8.5 x 7.9 mm similar to the prior ultrasound. \par  \par  Left lobe of the thyroid measures 4.2 x 1 x 1.5 cm mildly heterogeneous without focal lesion. \par  \par  \par  IMPRESSION: Although there is no significant interval change, ultrasound guided needle \par aspiration/biopsy is again recommended for the hypoechoic nodule in the right mid lobe. \par  \par  \par ***Electronically Signed *** \par ----------------------------------------------- \par Osmani Arreguin MD   "\par \par        Will request that she return to [place] for FNAB after authorization from her insurance company.  \par \par ROV in May. \par \par \par \par     \par \par \par \par \par Previous notes from [employer] appended below.\par \par October 11, 2018\par            .\par            32 yo evaluated by Dr. Umana for multinodular thyroid with hyperthyroidism associated with elevated TSI. FNAB at [facility] reassuring. TSH has returned into normal range.\par            Other issues include elevated BMI\par            .\par            Plan: 1. Continued surveillance of thyroid nodules by means of history, physical and follow up ultrasound.\par            2. Continued monitroing of previous hyperthyroidism apparently due to Grave's disease. .\par            3. Further evaluation of elevated BMI.

## 2019-09-13 LAB
ANION GAP SERPL CALC-SCNC: 12 MMOL/L
BUN SERPL-MCNC: 13 MG/DL
CALCIUM SERPL-MCNC: 9.3 MG/DL
CHLORIDE SERPL-SCNC: 103 MMOL/L
CO2 SERPL-SCNC: 26 MMOL/L
CREAT SERPL-MCNC: 1.19 MG/DL
GLUCOSE SERPL-MCNC: 106 MG/DL
POTASSIUM SERPL-SCNC: 4.4 MMOL/L
SODIUM SERPL-SCNC: 141 MMOL/L
T4 SERPL-MCNC: 7.1 UG/DL
THYROGLOB AB SERPL IA-ACNC: <1.8 IU/ML
TSH SERPL-ACNC: 1.89 UIU/ML

## 2019-10-29 ENCOUNTER — CHART COPY (OUTPATIENT)
Age: 34
End: 2019-10-29

## 2019-10-31 ENCOUNTER — CHART COPY (OUTPATIENT)
Age: 34
End: 2019-10-31

## 2019-11-18 ENCOUNTER — APPOINTMENT (OUTPATIENT)
Dept: INTERNAL MEDICINE | Facility: CLINIC | Age: 34
End: 2019-11-18
Payer: MEDICAID

## 2019-11-18 PROCEDURE — 36415 COLL VENOUS BLD VENIPUNCTURE: CPT

## 2019-11-18 PROCEDURE — 99213 OFFICE O/P EST LOW 20 MIN: CPT | Mod: 25

## 2019-11-18 NOTE — HISTORY OF PRESENT ILLNESS
[FreeTextEntry1] : Complaining of fatigue [de-identified] : 34-year-old female has history of vitamin B12 and vitamin D deficiency. She presents today to have them reevaluated as she has not been on vitamin D for months and her last vitamin B12 shot was over a year ago.

## 2019-11-18 NOTE — PHYSICAL EXAM
[No Acute Distress] : no acute distress [Well Nourished] : well nourished [Well Developed] : well developed [Normal Sclera/Conjunctiva] : normal sclera/conjunctiva [Well-Appearing] : well-appearing [EOMI] : extraocular movements intact [Normal Outer Ear/Nose] : the outer ears and nose were normal in appearance [PERRL] : pupils equal round and reactive to light [Normal Oropharynx] : the oropharynx was normal [No JVD] : no jugular venous distention [Supple] : supple [No Lymphadenopathy] : no lymphadenopathy [Thyroid Normal, No Nodules] : the thyroid was normal and there were no nodules present [No Respiratory Distress] : no respiratory distress  [No Accessory Muscle Use] : no accessory muscle use [Clear to Auscultation] : lungs were clear to auscultation bilaterally [Normal Rate] : normal rate  [Regular Rhythm] : with a regular rhythm [Normal S1, S2] : normal S1 and S2 [No Murmur] : no murmur heard [No Carotid Bruits] : no carotid bruits [No Abdominal Bruit] : a ~M bruit was not heard ~T in the abdomen [No Varicosities] : no varicosities [Pedal Pulses Present] : the pedal pulses are present [No Edema] : there was no peripheral edema [No Palpable Aorta] : no palpable aorta [No Extremity Clubbing/Cyanosis] : no extremity clubbing/cyanosis [Non Tender] : non-tender [Soft] : abdomen soft [Non-distended] : non-distended [No Masses] : no abdominal mass palpated [Normal Bowel Sounds] : normal bowel sounds [No HSM] : no HSM [Normal Posterior Cervical Nodes] : no posterior cervical lymphadenopathy [Normal Anterior Cervical Nodes] : no anterior cervical lymphadenopathy [No CVA Tenderness] : no CVA  tenderness [No Spinal Tenderness] : no spinal tenderness [No Joint Swelling] : no joint swelling [Grossly Normal Strength/Tone] : grossly normal strength/tone [Coordination Grossly Intact] : coordination grossly intact [No Rash] : no rash [No Focal Deficits] : no focal deficits [Normal Gait] : normal gait [Deep Tendon Reflexes (DTR)] : deep tendon reflexes were 2+ and symmetric [Normal Affect] : the affect was normal [Normal Insight/Judgement] : insight and judgment were intact

## 2019-11-18 NOTE — PLAN
[FreeTextEntry1] : 34-year-old female presents complaining of fatigue which she thinks is due to vitamin D and vitamin B12 deficiency. Both have been low in the past. She has not had vitamin B12 antibodies drawn. All levels will be drawn today before any medications prescribed. She will call me in 3 days to review results. Also advised she is long overdue for a physical

## 2019-11-19 LAB — 25(OH)D3 SERPL-MCNC: 14.5 NG/ML

## 2019-11-20 LAB
FOLATE SERPL-MCNC: 8.7 NG/ML
IF BLOCK AB SER QL: NORMAL
PCA AB SER QL IF: NORMAL
VIT B12 SERPL-MCNC: 290 PG/ML

## 2019-11-24 ENCOUNTER — RESULT CHARGE (OUTPATIENT)
Age: 34
End: 2019-11-24

## 2019-11-25 ENCOUNTER — APPOINTMENT (OUTPATIENT)
Dept: INTERNAL MEDICINE | Facility: CLINIC | Age: 34
End: 2019-11-25
Payer: MEDICAID

## 2019-11-25 VITALS
HEIGHT: 61 IN | SYSTOLIC BLOOD PRESSURE: 150 MMHG | BODY MASS INDEX: 41.54 KG/M2 | DIASTOLIC BLOOD PRESSURE: 70 MMHG | WEIGHT: 220 LBS

## 2019-11-25 DIAGNOSIS — H60.543 ACUTE ECZEMATOID OTITIS EXTERNA, BILATERAL: ICD-10-CM

## 2019-11-25 PROCEDURE — 36415 COLL VENOUS BLD VENIPUNCTURE: CPT

## 2019-11-25 PROCEDURE — G0008: CPT

## 2019-11-25 PROCEDURE — 90686 IIV4 VACC NO PRSV 0.5 ML IM: CPT

## 2019-11-25 PROCEDURE — 96372 THER/PROPH/DIAG INJ SC/IM: CPT

## 2019-11-25 PROCEDURE — 99395 PREV VISIT EST AGE 18-39: CPT | Mod: 25

## 2019-11-25 RX ORDER — CYANOCOBALAMIN 1000 UG/ML
1000 INJECTION INTRAMUSCULAR; SUBCUTANEOUS
Qty: 0 | Refills: 0 | Status: COMPLETED | OUTPATIENT
Start: 2019-11-25

## 2019-11-25 RX ADMIN — CYANOCOBALAMIN 0 MCG/ML: 1000 INJECTION INTRAMUSCULAR; SUBCUTANEOUS at 00:00

## 2019-11-25 NOTE — HISTORY OF PRESENT ILLNESS
[de-identified] : 34-year-old female with history of right thyroid lobectomy for malignancy presents for annual exam. History of vitamin B12 deficiency and vitamin D deficiency. Taking oral supplements of both but levels remain well below normal.\par Complaining of very itchy ears and sometimes she feels like she doesn't hear as well as she use to.\par Denies chest pain shortness of breath palpitations dizziness\par \par Up-to-date with screenings [FreeTextEntry1] : Annual exam

## 2019-11-25 NOTE — PLAN
[FreeTextEntry1] : 34 year-old female presents for annual exam with history as noted.\par Reviewed diet and stressed the importance of increasing physical activity\par Betamethasone ordered for eczema\par \par Restart vitamin B12 injections monthly as well as ergocalciferol 50,000 units weekly, recheck levels in 3 months\par \par Follow up one week for lab results in one month for vitamin B12 injection

## 2019-11-25 NOTE — PHYSICAL EXAM
[Ulcer] : no ulcer [Vesicles] : no vesicles [Thrush] : no thrush [Mucositis] : no mucositis  [Normal Female:] : bladder was normal on palpation [Normal] : normal gait, coordination grossly intact, no focal deficits [de-identified] : Deferred GYN; Denies pain, lumps and discharge [FreeTextEntry1] : Deferred GI/GYN [de-identified] : Bilateral ear canals dry and red [de-identified] : Denies excessive thirst, urination, fatigue [de-identified] : No lymphadenopathy [de-identified] : Alert and Oriented x3.  Appropriate mood and affect [de-identified] : No rash or skin lesion

## 2019-11-25 NOTE — HEALTH RISK ASSESSMENT
[Very Good] : ~his/her~  mood as very good [Yes] : Yes [Monthly or less (1 pt)] : Monthly or less (1 point) [1 or 2 (0 pts)] : 1 or 2 (0 points) [Never (0 pts)] : Never (0 points) [No falls in past year] : Patient reported no falls in the past year [0] : 2) Feeling down, depressed, or hopeless: Not at all (0) [Patient declined mammogram] : Patient declined mammogram [Patient reported PAP Smear was normal] : Patient reported PAP Smear was normal [Patient declined bone density test] : Patient declined bone density test [HIV test declined] : HIV test declined [Patient declined colonoscopy] : Patient declined colonoscopy [Hepatitis C test declined] : Hepatitis C test declined [] : No [[id number]] : 0 [PapSmearDate] : 10/15

## 2019-11-27 LAB
ALBUMIN SERPL ELPH-MCNC: 3.7 G/DL
ALP BLD-CCNC: 85 U/L
ALT SERPL-CCNC: 14 U/L
ANION GAP SERPL CALC-SCNC: 12 MMOL/L
APPEARANCE: ABNORMAL
AST SERPL-CCNC: 13 U/L
BASOPHILS # BLD AUTO: 0.04 K/UL
BASOPHILS NFR BLD AUTO: 0.3 %
BILIRUB SERPL-MCNC: 0.3 MG/DL
BILIRUBIN URINE: NEGATIVE
BLOOD URINE: NORMAL
BUN SERPL-MCNC: 13 MG/DL
CALCIUM SERPL-MCNC: 8.7 MG/DL
CHLORIDE SERPL-SCNC: 103 MMOL/L
CHOLEST SERPL-MCNC: 211 MG/DL
CHOLEST/HDLC SERPL: 4.9 RATIO
CO2 SERPL-SCNC: 23 MMOL/L
COLOR: YELLOW
CREAT SERPL-MCNC: 0.86 MG/DL
EOSINOPHIL # BLD AUTO: 0.25 K/UL
EOSINOPHIL NFR BLD AUTO: 1.7 %
FOLATE SERPL-MCNC: 7.4 NG/ML
GLUCOSE QUALITATIVE U: NEGATIVE
GLUCOSE SERPL-MCNC: 133 MG/DL
HCT VFR BLD CALC: 39.9 %
HDLC SERPL-MCNC: 43 MG/DL
HGB BLD-MCNC: 12.3 G/DL
IMM GRANULOCYTES NFR BLD AUTO: 0.7 %
KETONES URINE: NEGATIVE
LDLC SERPL CALC-MCNC: 118 MG/DL
LEUKOCYTE ESTERASE URINE: NEGATIVE
LYMPHOCYTES # BLD AUTO: 3.54 K/UL
LYMPHOCYTES NFR BLD AUTO: 23.8 %
MAN DIFF?: NORMAL
MCHC RBC-ENTMCNC: 26.5 PG
MCHC RBC-ENTMCNC: 30.8 GM/DL
MCV RBC AUTO: 85.8 FL
MONOCYTES # BLD AUTO: 0.63 K/UL
MONOCYTES NFR BLD AUTO: 4.2 %
NEUTROPHILS # BLD AUTO: 10.31 K/UL
NEUTROPHILS NFR BLD AUTO: 69.3 %
NITRITE URINE: NEGATIVE
PH URINE: 6
PLATELET # BLD AUTO: 341 K/UL
POTASSIUM SERPL-SCNC: 4.1 MMOL/L
PROT SERPL-MCNC: 7 G/DL
PROTEIN URINE: NORMAL
RBC # BLD: 4.65 M/UL
RBC # FLD: 15.2 %
SODIUM SERPL-SCNC: 138 MMOL/L
SPECIFIC GRAVITY URINE: 1.03
T4 FREE SERPL-MCNC: 1 NG/DL
TRIGL SERPL-MCNC: 248 MG/DL
TSH SERPL-ACNC: 3.12 UIU/ML
UROBILINOGEN URINE: NORMAL
VIT B12 SERPL-MCNC: 284 PG/ML
WBC # FLD AUTO: 14.87 K/UL

## 2019-12-30 ENCOUNTER — APPOINTMENT (OUTPATIENT)
Dept: INTERNAL MEDICINE | Facility: CLINIC | Age: 34
End: 2019-12-30
Payer: MEDICAID

## 2019-12-30 PROCEDURE — 96372 THER/PROPH/DIAG INJ SC/IM: CPT

## 2019-12-30 RX ORDER — CYANOCOBALAMIN 1000 UG/ML
1000 INJECTION INTRAMUSCULAR; SUBCUTANEOUS
Qty: 0 | Refills: 0 | Status: COMPLETED | OUTPATIENT
Start: 2019-12-30

## 2019-12-30 RX ADMIN — CYANOCOBALAMIN 1 MCG/ML: 1000 INJECTION, SOLUTION INTRAMUSCULAR; SUBCUTANEOUS at 00:00

## 2020-01-27 ENCOUNTER — APPOINTMENT (OUTPATIENT)
Dept: INTERNAL MEDICINE | Facility: CLINIC | Age: 35
End: 2020-01-27
Payer: MEDICAID

## 2020-01-27 ENCOUNTER — APPOINTMENT (OUTPATIENT)
Dept: INTERNAL MEDICINE | Facility: CLINIC | Age: 35
End: 2020-01-27

## 2020-01-27 PROCEDURE — 96372 THER/PROPH/DIAG INJ SC/IM: CPT

## 2020-01-27 RX ORDER — CYANOCOBALAMIN 1000 UG/ML
1000 INJECTION INTRAMUSCULAR; SUBCUTANEOUS
Qty: 0 | Refills: 0 | Status: COMPLETED | OUTPATIENT
Start: 2020-01-27

## 2020-01-27 RX ADMIN — CYANOCOBALAMIN 0 MCG/ML: 1000 INJECTION INTRAMUSCULAR; SUBCUTANEOUS at 00:00

## 2020-01-30 ENCOUNTER — RESULT REVIEW (OUTPATIENT)
Age: 35
End: 2020-01-30

## 2020-01-31 ENCOUNTER — APPOINTMENT (OUTPATIENT)
Dept: ENDOCRINOLOGY | Facility: CLINIC | Age: 35
End: 2020-01-31
Payer: MEDICAID

## 2020-01-31 ENCOUNTER — LABORATORY RESULT (OUTPATIENT)
Age: 35
End: 2020-01-31

## 2020-01-31 VITALS
HEART RATE: 82 BPM | DIASTOLIC BLOOD PRESSURE: 78 MMHG | BODY MASS INDEX: 42.29 KG/M2 | WEIGHT: 224 LBS | SYSTOLIC BLOOD PRESSURE: 130 MMHG | HEIGHT: 61 IN

## 2020-01-31 DIAGNOSIS — R63.5 ABNORMAL WEIGHT GAIN: ICD-10-CM

## 2020-01-31 PROCEDURE — 36415 COLL VENOUS BLD VENIPUNCTURE: CPT

## 2020-01-31 PROCEDURE — 99214 OFFICE O/P EST MOD 30 MIN: CPT | Mod: 25

## 2020-01-31 NOTE — PHYSICAL EXAM
[Alert] : alert [No Acute Distress] : no acute distress [Well Nourished] : well nourished [Well Developed] : well developed [Normal Voice/Communication] : normal voice communication [Healthy Appearance] : healthy appearance [No Neck Mass] : no neck mass was observed [No Proptosis] : no proptosis [Thyroid Not Enlarged] : the thyroid was not enlarged [Normal Rate and Effort] : normal respiratory rhythm and effort [No Respiratory Distress] : no respiratory distress [Well Healed Scar] : well healed scar [Normal Rate] : heart rate was normal  [Regular Rhythm] : with a regular rhythm [No Accessory Muscle Use] : no accessory muscle use [No Varicosities] : there were no varicosital changes [No Edema] : there was no peripheral edema [No Stigmata of Cushings Syndrome] : no stigmata of cushings syndrome [Normal Insight/Judgement] : insight and judgment were intact [Oriented x3] : oriented to person, place, and time [Normal Affect] : the affect was normal [Normal Mood] : the mood was normal

## 2020-02-01 NOTE — ASSESSMENT
[FreeTextEntry1] : &\par Sometimes notes anxiety.\par Prior to lobectomy, TSH was low, but now within normal range.\par Ultrasound of thyroid reassuring - cervical LN under surveillance.\par Thyroglobulin stable at 13.  \par Will repeat US thyroid/neck in one year.  \par ROV here in 5-6 months. \par May benefit from low dose levothyroxine.

## 2020-02-01 NOTE — HISTORY OF PRESENT ILLNESS
[FreeTextEntry1] : Jan 31, 2020\par \par PCP:  Dr. Cecil Scruggs/Juanita Guaman\par \par CC:  R thyroid nodule  (?hot on scan)->  4/11/19 Dr. Perez:  10 mm papillary thyroid carcinoma, follicular variant, infiltrative.\par         Previously low TSH\par         Elevated BMI\par          ?panic attacks -> [facility]\par \par 35 yo with previously low TSH, resolved after R lobectomy 4/2019 by Dr. Perez at which time 10 mm infiltrative papillary thyroid cancer removed.\par \par 9/12/2019 Tg 13, same as May 2019\par TSH 3.12 on no Rx.  \par \par US:  reassuring; prominent cervical lymph node.\par \par Plan:  Updated labs today.\par Start LT4 25 mcg daily if TSH elevated.  \par Updated labs before July visit.\par Check vit D \par Continued suveillance of thyroid bed, cervical lymph node, TFTs, Tg level.   \par   \par \par \par \par \par September 11, 2019\par PCP:  Dr. Cecil Scruggs/Juanita Guaman\par \par CC:  R thyroid nodule  (?hot on scan)->  4/11/19 Dr. Perez:  10 mm papillary thyroid carcinoma, follicular variant, infiltrative.\par         Previously low TSH\par         Elevated BMI\par \par \par FINAL DIAGNOSIS\par  \par Thyroid, right lobe and isthmus, lobectomy:\par 	PAPILLARY THYROID CARCINOMA, FOLLICULAR VARIANT,  INFILTRATIVE.\par 	Tumor size: 10 mm\par 	All margins are uninvolved by tumor; closest margin is anterior (0.5 mm), at site \par 	   of prior biopsy.\par 	One unremarkable parathyroid gland.\par 	No lymph nodes are identified.\par 	Pathologic Stage: pT1a NX.\par See Synoptic Report.\par \par Synoptic Report (Cancer Protocol of  February, 2019, of the College of \par 	American Pathologists):\par Procedure: Right lobectomy\par 	Tumor focality: Unifocal\par 	Tumor site: Right lobe\par 	Tumor size, greatest dimension: 10 mm \par 	Histologic type: Papillary carcinoma, follicular variant, infiltrative.\par 	Margins: Uninvolved by carcinoma.\par 		   Distance from closest margin: 0.5  mm from the inked and cauterized \par 		       anterior margin, at the site of prior biopsy, which there are reactive \par 	 	       changes.\par 	Angioinvasion: Not identified\par 	Lymphatic invasion: Not identified.\par 	Perineural invasion: Not identified.\par 	Mitotic rate:   Less than 1 per 2 square millimeters\par 	Extrathyroidal extension: Not identified.\par 	Regional lymph nodes: No lymph nodes submitted or found.\par 	Pathologic Stage Classification: pT1a NX\par \par \par Lobectomy on\par Labs at [place] on\par 5/17 iincluded\par Tg 13\par TSH 3.39\par \par Plan:  Labs today.\par US and\par ROV January\par \par \par \par \par May 17, 2019\par PCP:  Dr. Cecil Scruggs/Juanita Guaman\par \par CC:  R thyroid nodule  (?hot on scan)\par         Previously low TSH\par         Elevated BMI\par \par Underwent surgical removal of isthmus and R thyroid lobe for 10 mm papillary thyroid carcinoma, follicular variant, infiltrative.\par Feels well.\par Wants to lose weight.\par Has f/u appt to see Dr. Perez end of May.\par \par Plan:  TFTs, Tg today.\par ROV in August.\par \par \par February 21, 2019\par \par PCP:  Dr. Cecil Scruggs/Juanita Guaman\par \par CC:  R thyroid nodule  (?hot on scan)\par         Low TSH\par         Elevated BMI\par \par In course of evaluation for elevated BMI, TFTs obtained and TSH was low, TSI slightly elevated,\par ultrasound thyroid showed ~1 cm R thyroid nodule, thyroid scan interpreted as likely "hot" nodule\par with some suppression of rest of gland.    \par TSH went back to normal.\par Follow up ultrasound advised FNAB which is read as positive for papillary thyroid cancer.\par She will meet with Dr. Perez for his advice in the near future.  \par         \par         \par \par \par \par \par January 18, 2019\par \par PCP:  Dr. Cecil Scruggs\par \par 1.  Hyperthyroidism\par      Lab tests at [place] on\par   10/11/2018 included\par    TSH 0.58\par    T3 106   (had been 0.09  on 9/7/2017 at which time TSI was slightly elevated at 144 (0-139) \par \par     Recent [facility] labs showed normal TSH.   \par 2.  Multinodular thyroid.   Went for follow up ultrasound of thyroid at [place]:\par      " CLINICAL HISTORY: Toxic nodular goiter \par  \par  COMPARISON: 9/7/2017 \par  \par  FINDINGS: Thyroid isthmus heterogeneous unchanged measures 3.2 mm. \par  \par  Right lobe of the thyroid mildly heterogeneous measures 5.1 x 1.3 x 1.5 cm and contains a \par hypoechoic nodule in the mid pole 9.7 x 8.5 x 7.9 mm similar to the prior ultrasound. \par  \par  Left lobe of the thyroid measures 4.2 x 1 x 1.5 cm mildly heterogeneous without focal lesion. \par  \par  \par  IMPRESSION: Although there is no significant interval change, ultrasound guided needle \par aspiration/biopsy is again recommended for the hypoechoic nodule in the right mid lobe. \par  \par  \par ***Electronically Signed *** \par ----------------------------------------------- \par Osmani Arreguin MD   "\par \par        Will request that she return to [place] for FNAB after authorization from her insurance company.  \par \par ROV in May. \par \par \par \par     \par \par \par \par \par Previous notes from [employer] appended below.\par \par October 11, 2018\par            .\par            34 yo evaluated by Dr. Umana for multinodular thyroid with hyperthyroidism associated with elevated TSI. [facility]B at [facility] reassuring. TSH has returned into normal range.\par            Other issues include elevated BMI\par            .\par            Plan: 1. Continued surveillance of thyroid nodules by means of history, physical and follow up ultrasound.\par            2. Continued monitroing of previous hyperthyroidism apparently due to Grave's disease. .\par            3. Further evaluation of elevated BMI.

## 2020-02-02 LAB
25(OH)D3 SERPL-MCNC: 14.6 NG/ML
CORTIS SERPL-MCNC: 5.6 UG/DL
T3 SERPL-MCNC: 99 NG/DL
T4 SERPL-MCNC: 6.8 UG/DL
TSH SERPL-ACNC: 3.58 UIU/ML

## 2020-02-08 LAB — THYROGLOB AB SERPL IA-ACNC: <1.8 IU/ML

## 2020-02-25 ENCOUNTER — APPOINTMENT (OUTPATIENT)
Dept: INTERNAL MEDICINE | Facility: CLINIC | Age: 35
End: 2020-02-25
Payer: MEDICAID

## 2020-02-25 PROCEDURE — 96372 THER/PROPH/DIAG INJ SC/IM: CPT

## 2020-02-25 RX ORDER — CYANOCOBALAMIN 1000 UG/ML
1000 INJECTION INTRAMUSCULAR; SUBCUTANEOUS
Qty: 0 | Refills: 0 | Status: COMPLETED | OUTPATIENT
Start: 2020-02-25

## 2020-02-25 RX ADMIN — CYANOCOBALAMIN 0 MCG/ML: 1000 INJECTION, SOLUTION INTRAMUSCULAR; SUBCUTANEOUS at 00:00

## 2020-03-09 ENCOUNTER — APPOINTMENT (OUTPATIENT)
Dept: OBGYN | Facility: CLINIC | Age: 35
End: 2020-03-09

## 2020-03-23 ENCOUNTER — APPOINTMENT (OUTPATIENT)
Dept: INTERNAL MEDICINE | Facility: CLINIC | Age: 35
End: 2020-03-23

## 2020-04-17 ENCOUNTER — APPOINTMENT (OUTPATIENT)
Dept: INTERNAL MEDICINE | Facility: CLINIC | Age: 35
End: 2020-04-17
Payer: MEDICAID

## 2020-04-17 PROCEDURE — 96372 THER/PROPH/DIAG INJ SC/IM: CPT

## 2020-04-17 RX ORDER — CYANOCOBALAMIN 1000 UG/ML
1000 INJECTION INTRAMUSCULAR; SUBCUTANEOUS
Qty: 0 | Refills: 0 | Status: COMPLETED | OUTPATIENT
Start: 2020-04-17

## 2020-04-17 RX ADMIN — CYANOCOBALAMIN 0 MCG/ML: 1000 INJECTION INTRAMUSCULAR; SUBCUTANEOUS at 00:00

## 2020-05-28 ENCOUNTER — ASOB RESULT (OUTPATIENT)
Age: 35
End: 2020-05-28

## 2020-05-28 ENCOUNTER — APPOINTMENT (OUTPATIENT)
Dept: INTERNAL MEDICINE | Facility: CLINIC | Age: 35
End: 2020-05-28
Payer: MEDICAID

## 2020-05-28 ENCOUNTER — APPOINTMENT (OUTPATIENT)
Dept: OBGYN | Facility: CLINIC | Age: 35
End: 2020-05-28
Payer: MEDICAID

## 2020-05-28 VITALS
TEMPERATURE: 98.6 F | DIASTOLIC BLOOD PRESSURE: 78 MMHG | BODY MASS INDEX: 40.02 KG/M2 | SYSTOLIC BLOOD PRESSURE: 126 MMHG | HEIGHT: 61 IN | WEIGHT: 212 LBS

## 2020-05-28 PROCEDURE — 76830 TRANSVAGINAL US NON-OB: CPT | Mod: 26

## 2020-05-28 PROCEDURE — 96372 THER/PROPH/DIAG INJ SC/IM: CPT

## 2020-05-28 PROCEDURE — 99202 OFFICE O/P NEW SF 15 MIN: CPT

## 2020-05-28 RX ORDER — CYANOCOBALAMIN 1000 UG/ML
1000 INJECTION INTRAMUSCULAR; SUBCUTANEOUS
Qty: 0 | Refills: 0 | Status: COMPLETED | OUTPATIENT
Start: 2020-05-28

## 2020-05-28 RX ORDER — BETAMETHASONE VALERATE 1 MG/ML
0.1 LOTION CUTANEOUS DAILY
Qty: 1 | Refills: 0 | Status: DISCONTINUED | COMMUNITY
Start: 2019-11-25 | End: 2020-05-28

## 2020-05-28 RX ADMIN — CYANOCOBALAMIN 1 MCG/ML: 1000 INJECTION, SOLUTION INTRAMUSCULAR at 00:00

## 2020-05-28 NOTE — PHYSICAL EXAM
[Alert] : alert [Awake] : awake [Soft] : soft [Normal Mental Status] : the patient was oriented to person, place and time [Appropriate] : appropriate [No Lesions] : no genitalia lesions [Normal] : uterus [Pink Rugae] : pink rugae [Scant] : there was scant vaginal bleeding [Uterine Adnexae] : were not tender and not enlarged [Tender] : non tender [Erythema] : no erythema [Acute Distress] : no acute distress [Motion Tenderness] : there was no cervical motion tenderness [Discharge] : no discharge

## 2020-05-28 NOTE — REVIEW OF SYSTEMS
[Bloating] : bloating [Pelvic Pain] : pelvic pain [Abn Vag Bleeding] : abnormal vaginal bleeding [Nl] : Integumentary [Constipation] : no constipation [Frequency] : no frequency [Dysuria] : no dysuria [Diarrhea] : no diarrhea [Urgency] : no urgency

## 2020-05-29 ENCOUNTER — APPOINTMENT (OUTPATIENT)
Dept: INTERNAL MEDICINE | Facility: CLINIC | Age: 35
End: 2020-05-29
Payer: MEDICAID

## 2020-05-29 VITALS
DIASTOLIC BLOOD PRESSURE: 70 MMHG | WEIGHT: 212 LBS | BODY MASS INDEX: 40.02 KG/M2 | HEIGHT: 61 IN | SYSTOLIC BLOOD PRESSURE: 118 MMHG

## 2020-05-29 LAB
C TRACH RRNA SPEC QL NAA+PROBE: NOT DETECTED
HBV SURFACE AG SER QL: NONREACTIVE
HCG SERPL-MCNC: <1 MIU/ML
HCV AB SER QL: NONREACTIVE
HCV S/CO RATIO: 0.12 S/CO
HIV1+2 AB SPEC QL IA.RAPID: NONREACTIVE
HSV 1+2 IGG SER IA-IMP: NEGATIVE
HSV 1+2 IGG SER IA-IMP: POSITIVE
HSV1 IGG SER QL: >62.2 INDEX
HSV2 IGG SER QL: 0.19 INDEX
N GONORRHOEA RRNA SPEC QL NAA+PROBE: NOT DETECTED
SOURCE AMPLIFICATION: NORMAL
T PALLIDUM AB SER QL IA: NEGATIVE

## 2020-05-29 PROCEDURE — 36415 COLL VENOUS BLD VENIPUNCTURE: CPT

## 2020-05-29 PROCEDURE — 99213 OFFICE O/P EST LOW 20 MIN: CPT | Mod: 25

## 2020-05-29 NOTE — HISTORY OF PRESENT ILLNESS
[FreeTextEntry8] : 34-year-old obese female complaining of lower abdominal pain, goes from one side to the other and what she thinks is unusual bruising. She saw a gynecologist and had transvaginal ultrasound yesterday which was completely normal. Patient complaining of lower abdominal pressure and discomfort mostly when she tries to lay on her stomach. She is having regular bowel movements but they're small amount with hard stool for weeks now.\par \par The bruising she is concerned about 2 small bruises on left outer thigh which are mostly resolved (green yellow in color) appear to be small routine bruises as a result of minor trauma. She states that she had some other bruises on her forearm yesterday but they are completely resolved today

## 2020-05-29 NOTE — PHYSICAL EXAM
[No Acute Distress] : no acute distress [Well Nourished] : well nourished [Well Developed] : well developed [Well-Appearing] : well-appearing [Normal Sclera/Conjunctiva] : normal sclera/conjunctiva [PERRL] : pupils equal round and reactive to light [EOMI] : extraocular movements intact [Normal Outer Ear/Nose] : the outer ears and nose were normal in appearance [Normal Oropharynx] : the oropharynx was normal [No JVD] : no jugular venous distention [Supple] : supple [No Lymphadenopathy] : no lymphadenopathy [No Respiratory Distress] : no respiratory distress  [Thyroid Normal, No Nodules] : the thyroid was normal and there were no nodules present [No Accessory Muscle Use] : no accessory muscle use [Clear to Auscultation] : lungs were clear to auscultation bilaterally [Normal Rate] : normal rate  [Regular Rhythm] : with a regular rhythm [Normal S1, S2] : normal S1 and S2 [No Murmur] : no murmur heard [No Carotid Bruits] : no carotid bruits [No Abdominal Bruit] : a ~M bruit was not heard ~T in the abdomen [No Varicosities] : no varicosities [Pedal Pulses Present] : the pedal pulses are present [No Edema] : there was no peripheral edema [No Palpable Aorta] : no palpable aorta [No Extremity Clubbing/Cyanosis] : no extremity clubbing/cyanosis [Soft] : abdomen soft [Non-distended] : non-distended [No Masses] : no abdominal mass palpated [No HSM] : no HSM [Normal Posterior Cervical Nodes] : no posterior cervical lymphadenopathy [Normal Bowel Sounds] : normal bowel sounds [Normal Anterior Cervical Nodes] : no anterior cervical lymphadenopathy [No CVA Tenderness] : no CVA  tenderness [No Spinal Tenderness] : no spinal tenderness [No Joint Swelling] : no joint swelling [Grossly Normal Strength/Tone] : grossly normal strength/tone [No Rash] : no rash [Coordination Grossly Intact] : coordination grossly intact [No Focal Deficits] : no focal deficits [Normal Gait] : normal gait [Deep Tendon Reflexes (DTR)] : deep tendon reflexes were 2+ and symmetric [Normal Affect] : the affect was normal [Normal Insight/Judgement] : insight and judgment were intact [de-identified] : Tender to palpation across the lower abdomen [de-identified] : Insignificant bruise left thigh almost completely resolved

## 2020-05-29 NOTE — REVIEW OF SYSTEMS
[Nausea] : no nausea [Abdominal Pain] : abdominal pain [Constipation] : constipation [Diarrhea] : diarrhea [Melena] : no melena [Negative] : Heme/Lymph [de-identified] : bruise

## 2020-05-29 NOTE — PLAN
[FreeTextEntry1] : 34-year-old female with complaints as noted. Advised Metamucil daily, Gas-X, increase water intake if no improvement use MiraLax. Reassured that her bruising is not abnormal. Labs drawn today. Followup next week to discuss results

## 2020-05-30 LAB
25(OH)D3 SERPL-MCNC: 17.5 NG/ML
ALBUMIN SERPL ELPH-MCNC: 4 G/DL
ALP BLD-CCNC: 82 U/L
ALT SERPL-CCNC: 31 U/L
ANION GAP SERPL CALC-SCNC: 13 MMOL/L
APPEARANCE: CLEAR
APTT BLD: 27.5 SEC
AST SERPL-CCNC: 23 U/L
BASOPHILS # BLD AUTO: 0.05 K/UL
BASOPHILS NFR BLD AUTO: 0.4 %
BILIRUB SERPL-MCNC: 0.5 MG/DL
BILIRUBIN URINE: NEGATIVE
BLOOD URINE: NORMAL
BUN SERPL-MCNC: 13 MG/DL
CALCIUM SERPL-MCNC: 8.9 MG/DL
CHLORIDE SERPL-SCNC: 103 MMOL/L
CO2 SERPL-SCNC: 22 MMOL/L
COLOR: YELLOW
CREAT SERPL-MCNC: 1.02 MG/DL
EOSINOPHIL # BLD AUTO: 0.21 K/UL
EOSINOPHIL NFR BLD AUTO: 1.6 %
GLUCOSE QUALITATIVE U: NEGATIVE
GLUCOSE SERPL-MCNC: 89 MG/DL
HCT VFR BLD CALC: 44.1 %
HGB BLD-MCNC: 13.3 G/DL
IMM GRANULOCYTES NFR BLD AUTO: 0.8 %
INR PPP: 0.92 RATIO
KETONES URINE: NEGATIVE
LEUKOCYTE ESTERASE URINE: NEGATIVE
LYMPHOCYTES # BLD AUTO: 2.92 K/UL
LYMPHOCYTES NFR BLD AUTO: 22.7 %
MAN DIFF?: NORMAL
MCHC RBC-ENTMCNC: 27.1 PG
MCHC RBC-ENTMCNC: 30.2 GM/DL
MCV RBC AUTO: 90 FL
MONOCYTES # BLD AUTO: 0.58 K/UL
MONOCYTES NFR BLD AUTO: 4.5 %
NEUTROPHILS # BLD AUTO: 9 K/UL
NEUTROPHILS NFR BLD AUTO: 70 %
NITRITE URINE: NEGATIVE
PH URINE: 5.5
PLATELET # BLD AUTO: 364 K/UL
POTASSIUM SERPL-SCNC: 4.5 MMOL/L
PROT SERPL-MCNC: 6.7 G/DL
PROTEIN URINE: NEGATIVE
PT BLD: 10.5 SEC
RBC # BLD: 4.9 M/UL
RBC # FLD: 16.4 %
SODIUM SERPL-SCNC: 137 MMOL/L
SPECIFIC GRAVITY URINE: 1.02
UROBILINOGEN URINE: NORMAL
WBC # FLD AUTO: 12.86 K/UL

## 2020-05-31 LAB — BACTERIA UR CULT: NORMAL

## 2020-06-01 LAB
CANDIDA VAG CYTO: NOT DETECTED
G VAGINALIS+PREV SP MTYP VAG QL MICRO: NOT DETECTED
T VAGINALIS VAG QL WET PREP: NOT DETECTED

## 2020-06-02 LAB
HSV1 IGM SER QL: NORMAL TITER
HSV2 AB FLD-ACNC: NORMAL TITER

## 2020-06-29 ENCOUNTER — APPOINTMENT (OUTPATIENT)
Dept: ENDOCRINOLOGY | Facility: CLINIC | Age: 35
End: 2020-06-29
Payer: MEDICAID

## 2020-06-29 ENCOUNTER — LABORATORY RESULT (OUTPATIENT)
Age: 35
End: 2020-06-29

## 2020-06-29 VITALS
SYSTOLIC BLOOD PRESSURE: 125 MMHG | OXYGEN SATURATION: 97 % | WEIGHT: 210 LBS | HEART RATE: 98 BPM | HEIGHT: 61 IN | DIASTOLIC BLOOD PRESSURE: 80 MMHG | BODY MASS INDEX: 39.65 KG/M2

## 2020-06-29 PROCEDURE — 36415 COLL VENOUS BLD VENIPUNCTURE: CPT

## 2020-06-29 PROCEDURE — 99214 OFFICE O/P EST MOD 30 MIN: CPT | Mod: 25

## 2020-06-29 NOTE — HISTORY OF PRESENT ILLNESS
[FreeTextEntry1] : Jun 29, 2020    in person\par \par PCP:  Dr. Cecil Scruggs/Juanita Guaman\par \par CC:  9/7/17:  TSH 0.09 ([facility]); thyrotropin receptor ab 3.29 (0.0 - 1.75);   (0-139) \par         10/15/17:  24 hr RAIU 13 %, uptake confined almost entirely to R lobe, site of the 9.7 mm hypoechoic nodule\par         2/15/19:  FNAB R lobe nodule:  [place] VI: papillary thyroid cancer\par         4/11/19:  R lobectomy, Dr. Perez, papillary thyroid carcinoma, follicular variant, infiltrative** (not NIFTP).\par \par Now euthyroid.\par On no thyroid hormone Rx s/p R lobectomy for 1 cm follicular variant of papillary thyroid cancer, infiltrative.** (not NIFTP**)\par \par Impression:  Has not required thyroid hormone; however, will likely start eventually.\par Being monitored by US, labs, exam\par \par Plan: Labs today.  In future, repeat TSI, TBII. \par Updated US neck early December (with labs again then) and ROV within a week of \par \par **NIFTP:\par Noninvasive follicular thyroid neoplasm with papillary-like nuclear features is an encapsulated or clearly delimited, noninvasive neoplasm with a follicular growth pattern and nuclear features of papillary thyroid carcinoma . It is considered a 'pre-malignant' lesion of the BIJAN-like group.\par \par \par \par Jan 31, 2020\par \par PCP:  Dr. Cecil Scruggs/Juanita Guaman\par \par CC:  R thyroid nodule  (?hot on scan)->  4/11/19 Dr. Perez:  10 mm papillary thyroid carcinoma, follicular variant, infiltrative.\par         Previously low TSH\par         Elevated BMI\par          ?panic attacks -> [facility]\par \par 33 yo with previously low TSH, resolved after R lobectomy 4/2019 by Dr. Perez at which time 10 mm infiltrative papillary thyroid cancer removed.\par \par 9/12/2019 Tg 13, same as May 2019\par TSH 3.12 on no Rx.  \par \par US:  reassuring; prominent cervical lymph node.\par \par Plan:  Updated labs today.\par Start LT4 25 mcg daily if TSH elevated.  \par Updated labs before July visit.\par Check vit D \par Continued suveillance of thyroid bed, cervical lymph node, TFTs, Tg level.   \par   \par \par \par \par \par September 11, 2019\par PCP:  Dr. Cecil Scruggs/Juanita Guaman\par \par CC:  R thyroid nodule  (?hot on scan)->  4/11/19 Dr. Perez:  10 mm papillary thyroid carcinoma, follicular variant, infiltrative.\par         Previously low TSH\par         Elevated BMI\par \par \par FINAL DIAGNOSIS\par  \par Thyroid, right lobe and isthmus, lobectomy:\par 	PAPILLARY THYROID CARCINOMA, FOLLICULAR VARIANT,  INFILTRATIVE.\par 	Tumor size: 10 mm\par 	All margins are uninvolved by tumor; closest margin is anterior (0.5 mm), at site \par 	   of prior biopsy.\par 	One unremarkable parathyroid gland.\par 	No lymph nodes are identified.\par 	Pathologic Stage: pT1a NX.\par See Synoptic Report.\par \par Synoptic Report (Cancer Protocol of  February, 2019, of the College of \par 	[employer]):\par Procedure: Right lobectomy\par 	Tumor focality: Unifocal\par 	Tumor site: Right lobe\par 	Tumor size, greatest dimension: 10 mm \par 	Histologic type: Papillary carcinoma, follicular variant, infiltrative.\par 	Margins: Uninvolved by carcinoma.\par 		   Distance from closest margin: 0.5  mm from the inked and cauterized \par 		       anterior margin, at the site of prior biopsy, which there are reactive \par 	 	       changes.\par 	Angioinvasion: Not identified\par 	Lymphatic invasion: Not identified.\par 	Perineural invasion: Not identified.\par 	Mitotic rate:   Less than 1 per 2 square millimeters\par 	Extrathyroidal extension: Not identified.\par 	Regional lymph nodes: No lymph nodes submitted or found.\par 	Pathologic Stage Classification: pT1a NX\par \par \par Lobectomy on\par Labs at [facility] on\par 5/17 iincluded\par Tg 13\par TSH 3.39\par \par Plan:  Labs today.\par US and\par ROV January\par \par \par \par \par May 17, 2019\par PCP:  Dr. Cecil Scruggs/Juanita Guaman\par \par CC:  R thyroid nodule  (?hot on scan)\par         Previously low TSH\par         Elevated BMI\par \par Underwent surgical removal of isthmus and R thyroid lobe for 10 mm papillary thyroid carcinoma, follicular variant, infiltrative.\par Feels well.\par Wants to lose weight.\par Has f/u appt to see Dr. Perez end of May.\par \par Plan:  TFTs, Tg today.\par ROV in August.\par \par \par February 21, 2019\par \par PCP:  Dr. Cecil Scruggs/Juanita Guaman\par \par CC:  R thyroid nodule  (?hot on scan)\par         Low TSH\par         Elevated BMI\par \par In course of evaluation for elevated BMI, TFTs obtained and TSH was low, TSI slightly elevated,\par ultrasound thyroid showed ~1 cm R thyroid nodule, thyroid scan interpreted as likely "hot" nodule\par with some suppression of rest of gland.    \par TSH went back to normal.\par Follow up ultrasound advised FNAB which is read as positive for papillary thyroid cancer.\par She will meet with Dr. Perez for his advice in the near future.  \par         \par         \par \par \par \par \par January 18, 2019\par \par PCP:  Dr. Cecil Scruggs\par \par 1.  Hyperthyroidism\par      Lab tests at [facility] on\par   10/11/2018 included\par    TSH 0.58\par    T3 106   (had been 0.09  on 9/7/2017 at which time TSI was slightly elevated at 144 (0-139) \par \par     Recent [facility] labs showed normal TSH.   \par 2.  Multinodular thyroid.   Went for follow up ultrasound of thyroid at [facility]:\par      " CLINICAL HISTORY: Toxic nodular goiter \par  \par  COMPARISON: 9/7/2017 \par  \par  FINDINGS: Thyroid isthmus heterogeneous unchanged measures 3.2 mm. \par  \par  Right lobe of the thyroid mildly heterogeneous measures 5.1 x 1.3 x 1.5 cm and contains a \par hypoechoic nodule in the mid pole 9.7 x 8.5 x 7.9 mm similar to the prior ultrasound. \par  \par  Left lobe of the thyroid measures 4.2 x 1 x 1.5 cm mildly heterogeneous without focal lesion. \par  \par  \par  IMPRESSION: Although there is no significant interval change, ultrasound guided needle \par aspiration/biopsy is again recommended for the hypoechoic nodule in the right mid lobe. \par  \par  \par ***Electronically Signed *** \par ----------------------------------------------- \par Osmani Arreguin MD   "\par \par        Will request that she return to [facility] for FNAB after authorization from her insurance company.  \par \par ROV in May. \par \par \par \par     \par \par \par \par \par Previous notes from [employer] appended below.\par \par October 11, 2018\par            .\par            34 yo evaluated by Dr. Umana for multinodular thyroid with hyperthyroidism associated with elevated TSI. FNAB at [facility] reassuring. TSH has returned into normal range.\par            Other issues include elevated BMI\par            .\par            Plan: 1. Continued surveillance of thyroid nodules by means of history, physical and follow up ultrasound.\par            2. Continued monitroing of previous hyperthyroidism apparently due to Grave's disease. .\par            3. Further evaluation of elevated BMI.

## 2020-06-29 NOTE — PHYSICAL EXAM
[Alert] : alert [Well Nourished] : well nourished [Healthy Appearance] : healthy appearance [No Acute Distress] : no acute distress [Well Developed] : well developed [No Proptosis] : no proptosis [No Neck Mass] : no neck mass was observed [Thyroid Not Enlarged] : the thyroid was not enlarged [No Thyroid Nodules] : no palpable thyroid nodules [Well Healed Scar] : well healed scar [No Respiratory Distress] : no respiratory distress [No Tremors] : no tremors [No Involuntary Movements] : no involuntary movements were seen [Normal Rate] : heart rate was normal [Oriented x3] : oriented to person, place, and time [Normal Affect] : the affect was normal [Normal Insight/Judgement] : insight and judgment were intact [Normal Mood] : the mood was normal

## 2020-06-29 NOTE — ASSESSMENT
[FreeTextEntry1] : Original labs included suppressed TSH indicating thyroid overactivity.\par Elevated thyrotropin receptor antibody indicated likely due to Graves disase.\par Thyroid scan did not show elevated 24 hour uptake, but did support notion of "hot" right sided nodule.\par Biopsy of no\par \par 1 cm papillary thyroid cancer, follicular variant, infiltrative - remains under regular surveillance.

## 2020-07-13 LAB
ANION GAP SERPL CALC-SCNC: 15 MMOL/L
BUN SERPL-MCNC: 14 MG/DL
CALCIUM SERPL-MCNC: 8.9 MG/DL
CHLORIDE SERPL-SCNC: 105 MMOL/L
CO2 SERPL-SCNC: 21 MMOL/L
CREAT SERPL-MCNC: 1 MG/DL
GLUCOSE SERPL-MCNC: 101 MG/DL
POTASSIUM SERPL-SCNC: 4.3 MMOL/L
SODIUM SERPL-SCNC: 141 MMOL/L
T4 SERPL-MCNC: 7.8 UG/DL
THYROGLOB AB SERPL IA-ACNC: <1.8 IU/ML
TSH SERPL-ACNC: 1.82 UIU/ML

## 2020-08-06 ENCOUNTER — MED ADMIN CHARGE (OUTPATIENT)
Age: 35
End: 2020-08-06

## 2020-08-06 ENCOUNTER — APPOINTMENT (OUTPATIENT)
Dept: INTERNAL MEDICINE | Facility: CLINIC | Age: 35
End: 2020-08-06
Payer: MEDICAID

## 2020-08-06 ENCOUNTER — APPOINTMENT (OUTPATIENT)
Dept: INTERNAL MEDICINE | Facility: CLINIC | Age: 35
End: 2020-08-06

## 2020-08-06 PROCEDURE — 96372 THER/PROPH/DIAG INJ SC/IM: CPT

## 2020-08-06 PROCEDURE — 36415 COLL VENOUS BLD VENIPUNCTURE: CPT

## 2020-08-06 RX ORDER — CYANOCOBALAMIN 1000 UG/ML
1000 INJECTION INTRAMUSCULAR; SUBCUTANEOUS
Qty: 0 | Refills: 0 | Status: COMPLETED | OUTPATIENT
Start: 2020-08-06

## 2020-08-06 RX ADMIN — CYANOCOBALAMIN 0 MCG/ML: 1000 INJECTION INTRAMUSCULAR; SUBCUTANEOUS at 00:00

## 2020-08-07 ENCOUNTER — APPOINTMENT (OUTPATIENT)
Dept: OBGYN | Facility: CLINIC | Age: 35
End: 2020-08-07
Payer: MEDICAID

## 2020-08-07 VITALS
SYSTOLIC BLOOD PRESSURE: 120 MMHG | HEIGHT: 61 IN | BODY MASS INDEX: 40.4 KG/M2 | DIASTOLIC BLOOD PRESSURE: 80 MMHG | WEIGHT: 214 LBS

## 2020-08-07 LAB — HCG SERPL-MCNC: <1 MIU/ML

## 2020-08-07 PROCEDURE — 99395 PREV VISIT EST AGE 18-39: CPT

## 2020-08-07 RX ORDER — ERGOCALCIFEROL 1.25 MG/1
1.25 MG CAPSULE ORAL
Qty: 13 | Refills: 2 | Status: DISCONTINUED | COMMUNITY
Start: 2019-11-25 | End: 2020-08-07

## 2020-08-14 LAB
CYTOLOGY CVX/VAG DOC THIN PREP: ABNORMAL
HPV HIGH+LOW RISK DNA PNL CVX: NOT DETECTED

## 2020-09-11 ENCOUNTER — APPOINTMENT (OUTPATIENT)
Dept: OBGYN | Facility: CLINIC | Age: 35
End: 2020-09-11

## 2020-10-08 ENCOUNTER — APPOINTMENT (OUTPATIENT)
Dept: OBGYN | Facility: CLINIC | Age: 35
End: 2020-10-08
Payer: MEDICAID

## 2020-10-08 VITALS
BODY MASS INDEX: 39.84 KG/M2 | DIASTOLIC BLOOD PRESSURE: 92 MMHG | HEIGHT: 61 IN | WEIGHT: 211 LBS | SYSTOLIC BLOOD PRESSURE: 124 MMHG

## 2020-10-08 PROCEDURE — 36415 COLL VENOUS BLD VENIPUNCTURE: CPT

## 2020-10-08 PROCEDURE — 99213 OFFICE O/P EST LOW 20 MIN: CPT

## 2020-10-08 NOTE — PLAN
[FreeTextEntry1] : UPT neg in office today\par Likely bleeding is menses\par unclear if UPT was accurate since two negative since then\par will check HCG\par \par Reviewed cycles - regular lasting 2-7 days every 25-30 days\par highly desires pregnancy\par \par likely will refer to [facility] given age\par \par answered questions\par \par Toshia Garcia MD, PhD\par

## 2020-10-08 NOTE — HISTORY OF PRESENT ILLNESS
[FreeTextEntry1] : Ms Clinton is a 34 yo  who presents today with bleeding and possible pregnancy\par \par No interim changes in health\par \par LMP 9/10\par took UPT 1 wk later because felt like possibly pregnant - UPT was positive\par then too again in Oct was negative\par \par Spotting past two days\par Overall feels well\par \par concerned about missing early pregnancy because first pregnancy was when she was 15 yo and did not realize until over 3 mo pregnant - daughter now 19 yo and in college\par UPT was negative first three months\par \par

## 2020-10-09 LAB — HCG SERPL-MCNC: <1 MIU/ML

## 2020-10-21 ENCOUNTER — APPOINTMENT (OUTPATIENT)
Dept: INTERNAL MEDICINE | Facility: CLINIC | Age: 35
End: 2020-10-21
Payer: MEDICAID

## 2020-10-21 ENCOUNTER — MED ADMIN CHARGE (OUTPATIENT)
Age: 35
End: 2020-10-21

## 2020-10-21 PROCEDURE — 99072 ADDL SUPL MATRL&STAF TM PHE: CPT

## 2020-10-21 PROCEDURE — 96372 THER/PROPH/DIAG INJ SC/IM: CPT

## 2020-10-21 RX ORDER — CYANOCOBALAMIN 1000 UG/ML
1000 INJECTION INTRAMUSCULAR; SUBCUTANEOUS
Qty: 0 | Refills: 0 | Status: COMPLETED | OUTPATIENT
Start: 2020-10-21

## 2020-10-21 RX ADMIN — CYANOCOBALAMIN 0 MCG/ML: 1000 INJECTION INTRAMUSCULAR; SUBCUTANEOUS at 00:00

## 2020-12-02 ENCOUNTER — APPOINTMENT (OUTPATIENT)
Dept: ENDOCRINOLOGY | Facility: CLINIC | Age: 35
End: 2020-12-02

## 2020-12-21 PROBLEM — J06.9 VIRAL UPPER RESPIRATORY TRACT INFECTION: Status: RESOLVED | Noted: 2019-04-18 | Resolved: 2020-12-21

## 2020-12-21 PROBLEM — Z11.1 ENCOUNTER FOR PPD TEST: Status: RESOLVED | Noted: 2019-06-03 | Resolved: 2020-12-21

## 2021-01-28 ENCOUNTER — APPOINTMENT (OUTPATIENT)
Dept: FAMILY MEDICINE | Facility: CLINIC | Age: 36
End: 2021-01-28

## 2021-02-19 ENCOUNTER — APPOINTMENT (OUTPATIENT)
Dept: INTERNAL MEDICINE | Facility: CLINIC | Age: 36
End: 2021-02-19

## 2021-03-03 ENCOUNTER — LABORATORY RESULT (OUTPATIENT)
Age: 36
End: 2021-03-03

## 2021-03-03 ENCOUNTER — RESULT REVIEW (OUTPATIENT)
Age: 36
End: 2021-03-03

## 2021-04-12 ENCOUNTER — APPOINTMENT (OUTPATIENT)
Dept: INTERNAL MEDICINE | Facility: CLINIC | Age: 36
End: 2021-04-12
Payer: MEDICAID

## 2021-04-12 ENCOUNTER — MED ADMIN CHARGE (OUTPATIENT)
Age: 36
End: 2021-04-12

## 2021-04-12 PROCEDURE — 99072 ADDL SUPL MATRL&STAF TM PHE: CPT

## 2021-04-12 PROCEDURE — 96372 THER/PROPH/DIAG INJ SC/IM: CPT

## 2021-04-12 RX ORDER — CYANOCOBALAMIN 1000 UG/ML
1000 INJECTION INTRAMUSCULAR; SUBCUTANEOUS
Qty: 0 | Refills: 0 | Status: COMPLETED | OUTPATIENT
Start: 2021-04-12

## 2021-04-12 RX ADMIN — CYANOCOBALAMIN 0 MCG/ML: 1000 INJECTION INTRAMUSCULAR; SUBCUTANEOUS at 00:00

## 2021-04-20 ENCOUNTER — NON-APPOINTMENT (OUTPATIENT)
Age: 36
End: 2021-04-20

## 2021-04-20 ENCOUNTER — APPOINTMENT (OUTPATIENT)
Dept: FAMILY MEDICINE | Facility: CLINIC | Age: 36
End: 2021-04-20
Payer: MEDICAID

## 2021-04-20 VITALS
WEIGHT: 214 LBS | OXYGEN SATURATION: 99 % | HEART RATE: 90 BPM | SYSTOLIC BLOOD PRESSURE: 128 MMHG | BODY MASS INDEX: 40.4 KG/M2 | TEMPERATURE: 97.6 F | DIASTOLIC BLOOD PRESSURE: 98 MMHG | RESPIRATION RATE: 16 BRPM | HEIGHT: 61 IN

## 2021-04-20 DIAGNOSIS — N92.6 IRREGULAR MENSTRUATION, UNSPECIFIED: ICD-10-CM

## 2021-04-20 DIAGNOSIS — Z92.29 PERSONAL HISTORY OF OTHER DRUG THERAPY: ICD-10-CM

## 2021-04-20 DIAGNOSIS — Z01.84 ENCOUNTER FOR ANTIBODY RESPONSE EXAMINATION: ICD-10-CM

## 2021-04-20 DIAGNOSIS — Z87.19 PERSONAL HISTORY OF OTHER DISEASES OF THE DIGESTIVE SYSTEM: ICD-10-CM

## 2021-04-20 DIAGNOSIS — Z86.2 PERSONAL HISTORY OF DISEASES OF THE BLOOD AND BLOOD-FORMING ORGANS AND CERTAIN DISORDERS INVOLVING THE IMMUNE MECHANISM: ICD-10-CM

## 2021-04-20 PROCEDURE — 36415 COLL VENOUS BLD VENIPUNCTURE: CPT

## 2021-04-20 PROCEDURE — G0444 DEPRESSION SCREEN ANNUAL: CPT

## 2021-04-20 PROCEDURE — G0442 ANNUAL ALCOHOL SCREEN 15 MIN: CPT

## 2021-04-20 PROCEDURE — 99395 PREV VISIT EST AGE 18-39: CPT | Mod: 25

## 2021-04-20 PROCEDURE — 81025 URINE PREGNANCY TEST: CPT

## 2021-04-20 PROCEDURE — 99072 ADDL SUPL MATRL&STAF TM PHE: CPT

## 2021-04-20 PROCEDURE — G0447 BEHAVIOR COUNSEL OBESITY 15M: CPT

## 2021-04-23 LAB
25(OH)D3 SERPL-MCNC: 12.6 NG/ML
ALBUMIN SERPL ELPH-MCNC: 3.9 G/DL
ALP BLD-CCNC: 86 U/L
ALT SERPL-CCNC: 16 U/L
ANION GAP SERPL CALC-SCNC: 13 MMOL/L
AST SERPL-CCNC: 15 U/L
BASOPHILS # BLD AUTO: 0.05 K/UL
BASOPHILS NFR BLD AUTO: 0.4 %
BILIRUB SERPL-MCNC: 0.5 MG/DL
BUN SERPL-MCNC: 15 MG/DL
CALCIUM SERPL-MCNC: 8.8 MG/DL
CHLORIDE SERPL-SCNC: 102 MMOL/L
CHOLEST SERPL-MCNC: 207 MG/DL
CO2 SERPL-SCNC: 22 MMOL/L
CREAT SERPL-MCNC: 0.94 MG/DL
EOSINOPHIL # BLD AUTO: 0.34 K/UL
EOSINOPHIL NFR BLD AUTO: 2.6 %
ESTIMATED AVERAGE GLUCOSE: 111 MG/DL
FERRITIN SERPL-MCNC: 47 NG/ML
FOLATE SERPL-MCNC: 13.2 NG/ML
GLUCOSE SERPL-MCNC: 95 MG/DL
HAV IGM SER QL: NONREACTIVE
HBA1C MFR BLD HPLC: 5.5 %
HBV SURFACE AB SER QL: NONREACTIVE
HBV SURFACE AG SER QL: NONREACTIVE
HCG SERPL-MCNC: <1 MIU/ML
HCG UR QL: NEGATIVE
HCT VFR BLD CALC: 43 %
HCV AB SER QL: NONREACTIVE
HCV S/CO RATIO: 0.1 S/CO
HDLC SERPL-MCNC: 54 MG/DL
HEPATITIS A IGG ANTIBODY: NONREACTIVE
HGB BLD-MCNC: 13.4 G/DL
HIV1+2 AB SPEC QL IA.RAPID: NONREACTIVE
IMM GRANULOCYTES NFR BLD AUTO: 0.5 %
IRON SATN MFR SERPL: 15 %
IRON SERPL-MCNC: 52 UG/DL
LDLC SERPL CALC-MCNC: 125 MG/DL
LYMPHOCYTES # BLD AUTO: 3.24 K/UL
LYMPHOCYTES NFR BLD AUTO: 25 %
MAN DIFF?: NORMAL
MCHC RBC-ENTMCNC: 27.7 PG
MCHC RBC-ENTMCNC: 31.2 GM/DL
MCV RBC AUTO: 88.8 FL
MONOCYTES # BLD AUTO: 0.69 K/UL
MONOCYTES NFR BLD AUTO: 5.3 %
NEUTROPHILS # BLD AUTO: 8.58 K/UL
NEUTROPHILS NFR BLD AUTO: 66.2 %
NONHDLC SERPL-MCNC: 152 MG/DL
PLATELET # BLD AUTO: 376 K/UL
POTASSIUM SERPL-SCNC: 4.4 MMOL/L
PROT SERPL-MCNC: 6.9 G/DL
QUALITY CONTROL: YES
RBC # BLD: 4.84 M/UL
RBC # FLD: 15.1 %
SODIUM SERPL-SCNC: 137 MMOL/L
TIBC SERPL-MCNC: 347 UG/DL
TRIGL SERPL-MCNC: 137 MG/DL
UIBC SERPL-MCNC: 295 UG/DL
VIT B12 SERPL-MCNC: 568 PG/ML
WBC # FLD AUTO: 12.97 K/UL

## 2021-04-23 NOTE — PLAN
[FreeTextEntry1] : 36 yo F with hx of papillary thyroid carcinoma, s/ p resection\par - screening labs drawn in office today\par - anxiety over daughter leaving to [place] and empty nest syndrome discussed with patient\par - follows with endocrinology routinely

## 2021-04-23 NOTE — COUNSELING
[Benefits of weight loss discussed] : Benefits of weight loss discussed [Potential consequences of obesity discussed] : Potential consequences of obesity discussed [Encouraged to maintain food diary] : Encouraged to maintain food diary [Encouraged to increase physical activity] : Encouraged to increase physical activity [Encouraged to use exercise tracking device] : Encouraged to use exercise tracking device [FreeTextEntry4] : 15

## 2021-04-23 NOTE — PHYSICAL EXAM
[No Acute Distress] : no acute distress [Well Nourished] : well nourished [Well Developed] : well developed [Well-Appearing] : well-appearing [Normal Sclera/Conjunctiva] : normal sclera/conjunctiva [PERRL] : pupils equal round and reactive to light [EOMI] : extraocular movements intact [Normal Outer Ear/Nose] : the outer ears and nose were normal in appearance [Normal Oropharynx] : the oropharynx was normal [No JVD] : no jugular venous distention [No Lymphadenopathy] : no lymphadenopathy [Supple] : supple [Thyroid Normal, No Nodules] : the thyroid was normal and there were no nodules present [No Respiratory Distress] : no respiratory distress  [No Accessory Muscle Use] : no accessory muscle use [Clear to Auscultation] : lungs were clear to auscultation bilaterally [Normal Rate] : normal rate  [Normal S1, S2] : normal S1 and S2 [Regular Rhythm] : with a regular rhythm [No Murmur] : no murmur heard [No Abdominal Bruit] : a ~M bruit was not heard ~T in the abdomen [No Carotid Bruits] : no carotid bruits [No Varicosities] : no varicosities [Pedal Pulses Present] : the pedal pulses are present [No Edema] : there was no peripheral edema [No Palpable Aorta] : no palpable aorta [No Extremity Clubbing/Cyanosis] : no extremity clubbing/cyanosis [Soft] : abdomen soft [Non Tender] : non-tender [Non-distended] : non-distended [No HSM] : no HSM [No Masses] : no abdominal mass palpated [Normal Bowel Sounds] : normal bowel sounds [Normal Posterior Cervical Nodes] : no posterior cervical lymphadenopathy [Normal Anterior Cervical Nodes] : no anterior cervical lymphadenopathy [No CVA Tenderness] : no CVA  tenderness [No Spinal Tenderness] : no spinal tenderness [No Joint Swelling] : no joint swelling [Grossly Normal Strength/Tone] : grossly normal strength/tone [No Rash] : no rash [Coordination Grossly Intact] : coordination grossly intact [No Focal Deficits] : no focal deficits [Normal Gait] : normal gait [Deep Tendon Reflexes (DTR)] : deep tendon reflexes were 2+ and symmetric [Normal Affect] : the affect was normal [Normal Insight/Judgement] : insight and judgment were intact

## 2021-04-23 NOTE — HISTORY OF PRESENT ILLNESS
[FreeTextEntry1] : 34 yo F, annual physical [de-identified] : Patient is a 36 yo F here for an annual physical. Patient reports no acute concerns at this moment but does want to obtain pregnancy test. Additionally, requests pregnancy test via blood work because reports previously did not know she was three months pregnant as urine screens kept returning negative. Additionally, pt currently being followed by Dr. Hellerman for hx of papillary carcinoma of the thyroid; has had partial thyroidectomy; now on routine surveillance; recently completed blood work and ultrasounds. Currently main complaint is mild anxiety and stress over daughter moving to [place] for college as she is worried because she will be very far from her. \par \par

## 2021-04-23 NOTE — HEALTH RISK ASSESSMENT
[Good] : ~his/her~  mood as  good [Yes] : Yes [2 - 3 times a week (3 pts)] : 2 - 3  times a week (3 points) [No falls in past year] : Patient reported no falls in the past year [0] : 2) Feeling down, depressed, or hopeless: Not at all (0) [] : No [Audit-CScore] : 3 [[id number]] : 0 [Patient reported PAP Smear was normal] : Patient reported PAP Smear was normal [HIV Test offered] : HIV Test offered [Hepatitis C test offered] : Hepatitis C test offered [Change in mental status noted] : No change in mental status noted [None] : None [Unemployed] : unemployed [With Family] : lives with family [Significant Other] : lives with significant other [Sexually Active] : sexually active [High Risk Behavior] : no high risk behavior [Feels Safe at Home] : Feels safe at home [Fully functional (bathing, dressing, toileting, transferring, walking, feeding)] : Fully functional (bathing, dressing, toileting, transferring, walking, feeding) [Fully functional (using the telephone, shopping, preparing meals, housekeeping, doing laundry, using] : Fully functional and needs no help or supervision to perform IADLs (using the telephone, shopping, preparing meals, housekeeping, doing laundry, using transportation, managing medications and managing finances) [Reports changes in hearing] : Reports no changes in hearing [Reports changes in vision] : Reports no changes in vision [Reports normal functional visual acuity (ie: able to read med bottle)] : Reports normal functional visual acuity [Smoke Detector] : smoke detector [Reports changes in dental health] : Reports no changes in dental health [PapSmearDate] : 2021 [de-identified] : does not use condoms, one partner

## 2021-06-01 ENCOUNTER — APPOINTMENT (OUTPATIENT)
Dept: INTERNAL MEDICINE | Facility: CLINIC | Age: 36
End: 2021-06-01
Payer: MEDICAID

## 2021-06-01 ENCOUNTER — MED ADMIN CHARGE (OUTPATIENT)
Age: 36
End: 2021-06-01

## 2021-06-01 PROCEDURE — 96372 THER/PROPH/DIAG INJ SC/IM: CPT

## 2021-06-01 RX ADMIN — CYANOCOBALAMIN 1 MCG/ML: 1000 INJECTION INTRAMUSCULAR; SUBCUTANEOUS at 00:00

## 2021-06-04 RX ORDER — CYANOCOBALAMIN 1000 UG/ML
1000 INJECTION INTRAMUSCULAR; SUBCUTANEOUS
Qty: 0 | Refills: 0 | Status: COMPLETED | OUTPATIENT
Start: 2021-06-01

## 2021-07-12 ENCOUNTER — APPOINTMENT (OUTPATIENT)
Dept: INTERNAL MEDICINE | Facility: CLINIC | Age: 36
End: 2021-07-12
Payer: MEDICAID

## 2021-07-12 VITALS
BODY MASS INDEX: 40.97 KG/M2 | HEIGHT: 61 IN | WEIGHT: 217 LBS | DIASTOLIC BLOOD PRESSURE: 80 MMHG | SYSTOLIC BLOOD PRESSURE: 130 MMHG

## 2021-07-12 DIAGNOSIS — R42 DIZZINESS AND GIDDINESS: ICD-10-CM

## 2021-07-12 PROCEDURE — 36415 COLL VENOUS BLD VENIPUNCTURE: CPT

## 2021-07-12 PROCEDURE — 96372 THER/PROPH/DIAG INJ SC/IM: CPT

## 2021-07-12 PROCEDURE — 99213 OFFICE O/P EST LOW 20 MIN: CPT | Mod: 25

## 2021-07-12 RX ORDER — CYANOCOBALAMIN 1000 UG/ML
1000 INJECTION INTRAMUSCULAR; SUBCUTANEOUS
Qty: 0 | Refills: 0 | Status: COMPLETED | OUTPATIENT
Start: 2021-07-12

## 2021-07-12 RX ADMIN — CYANOCOBALAMIN 1 MCG/ML: 1000 INJECTION, SOLUTION INTRAMUSCULAR at 00:00

## 2021-07-12 NOTE — HISTORY OF PRESENT ILLNESS
[FreeTextEntry8] : 35-year-old morbidly obese female presents complaining of occasional lightheadedness when she turns her head or bends over as well as confusion regarding vitamin D dose and vitamin B12.\par Lightheadedness occurs when she turns right to left quickly or when she bends over. Denies any associated symptoms. She states she's been drinking a lot of water since she stopped drinking soda. She denies chest pain shortness of breath palpitations dizziness\par She's made dietary changes i.e. discontinuing soda and is walking regularly but still complaining about feeling bloated and having constipation. She evidently was diagnosed with H. pylori a few years ago was prescribed medication but she didn't take it for a very least didn't complete the course and she is concerned that her abdominal discomfort, constipation, bloating is due to the H. pylori\par \par She took vitamin D 50,000 weekly for 4 weeks a couple of months ago but is unsure now how much she should be taking. Her vitamin B12 has been low on numerous occasions even though she takes oral B12 as well as a multivitamin, she is not a vegetarian. She is unsure if she should continue vitamin B12 shots, last B12 in April was good

## 2021-07-12 NOTE — PLAN
[FreeTextEntry1] : 35-year-old female is noted with symptoms consistent with positional lightheadedness, advised to drink to thirst, no excessive water. Lab work done today\par \par Reviewed diet and exercise, continue healthy diet and exercise, advised MiraLax p.r.n. and Metamucil daily. Also stressed the importance of seeing Dr. Dee regarding H. pylori\par vitamin D dose will be decided when today's lab results are available, B12 shot given today\par \par If symptoms increase or persist return to office

## 2021-07-12 NOTE — REVIEW OF SYSTEMS
[Constipation] : constipation [Negative] : Heme/Lymph [Abdominal Pain] : no abdominal pain [Diarrhea] : diarrhea [Vomiting] : no vomiting [Melena] : no melena [Headache] : no headache [Dizziness] : no dizziness [Fainting] : no fainting [Unsteady Walking] : no ataxia [FreeTextEntry7] : bloating [de-identified] : positional lightheadedness

## 2021-07-12 NOTE — COUNSELING
[Potential consequences of obesity discussed] : Potential consequences of obesity discussed [Benefits of weight loss discussed] : Benefits of weight loss discussed [Structured Weight Management Program suggested:] : Structured weight management program suggested [Encouraged to maintain food diary] : Encouraged to maintain food diary [Encouraged to increase physical activity] : Encouraged to increase physical activity [FreeTextEntry1] : referred to Dr Shine Mathis

## 2021-07-13 LAB
25(OH)D3 SERPL-MCNC: 28.9 NG/ML
ALBUMIN SERPL ELPH-MCNC: 4.1 G/DL
ALP BLD-CCNC: 90 U/L
ALT SERPL-CCNC: 22 U/L
ANION GAP SERPL CALC-SCNC: 14 MMOL/L
AST SERPL-CCNC: 15 U/L
BASOPHILS # BLD AUTO: 0.07 K/UL
BASOPHILS NFR BLD AUTO: 0.5 %
BILIRUB SERPL-MCNC: 0.3 MG/DL
BUN SERPL-MCNC: 18 MG/DL
CALCIUM SERPL-MCNC: 9.1 MG/DL
CHLORIDE SERPL-SCNC: 102 MMOL/L
CO2 SERPL-SCNC: 22 MMOL/L
CREAT SERPL-MCNC: 0.96 MG/DL
EOSINOPHIL # BLD AUTO: 0.23 K/UL
EOSINOPHIL NFR BLD AUTO: 1.7 %
FOLATE SERPL-MCNC: 5.7 NG/ML
GLUCOSE SERPL-MCNC: 84 MG/DL
HCT VFR BLD CALC: 41.3 %
HGB BLD-MCNC: 12.9 G/DL
IMM GRANULOCYTES NFR BLD AUTO: 0.4 %
LYMPHOCYTES # BLD AUTO: 3.39 K/UL
LYMPHOCYTES NFR BLD AUTO: 25.1 %
MAN DIFF?: NORMAL
MCHC RBC-ENTMCNC: 28.1 PG
MCHC RBC-ENTMCNC: 31.2 GM/DL
MCV RBC AUTO: 90 FL
MONOCYTES # BLD AUTO: 0.74 K/UL
MONOCYTES NFR BLD AUTO: 5.5 %
NEUTROPHILS # BLD AUTO: 9.02 K/UL
NEUTROPHILS NFR BLD AUTO: 66.8 %
PLATELET # BLD AUTO: 322 K/UL
POTASSIUM SERPL-SCNC: 4.3 MMOL/L
PROT SERPL-MCNC: 7.1 G/DL
RBC # BLD: 4.59 M/UL
RBC # FLD: 14.7 %
SODIUM SERPL-SCNC: 139 MMOL/L
VIT B12 SERPL-MCNC: 413 PG/ML
WBC # FLD AUTO: 13.51 K/UL

## 2021-08-18 ENCOUNTER — APPOINTMENT (OUTPATIENT)
Dept: INTERNAL MEDICINE | Facility: CLINIC | Age: 36
End: 2021-08-18
Payer: MEDICAID

## 2021-08-18 PROCEDURE — 96372 THER/PROPH/DIAG INJ SC/IM: CPT

## 2021-08-18 RX ORDER — CYANOCOBALAMIN 1000 UG/ML
1000 INJECTION INTRAMUSCULAR; SUBCUTANEOUS
Qty: 0 | Refills: 0 | Status: COMPLETED | OUTPATIENT
Start: 2021-08-18

## 2021-08-18 RX ADMIN — CYANOCOBALAMIN 0 MCG/ML: 1000 INJECTION INTRAMUSCULAR; SUBCUTANEOUS at 00:00

## 2021-09-06 ENCOUNTER — NON-APPOINTMENT (OUTPATIENT)
Age: 36
End: 2021-09-06

## 2021-09-06 LAB
ANION GAP SERPL CALC-SCNC: 12 MMOL/L
BUN SERPL-MCNC: 17 MG/DL
CALCIUM SERPL-MCNC: 8.6 MG/DL
CHLORIDE SERPL-SCNC: 101 MMOL/L
CO2 SERPL-SCNC: 23 MMOL/L
CREAT SERPL-MCNC: 0.97 MG/DL
GLUCOSE SERPL-MCNC: 83 MG/DL
POTASSIUM SERPL-SCNC: 5.4 MMOL/L
SODIUM SERPL-SCNC: 136 MMOL/L
T4 SERPL-MCNC: 8.2 UG/DL
THYROGLOB AB SERPL IA-ACNC: <1.8 IU/ML
TSH SERPL-ACNC: 2.84 UIU/ML

## 2021-09-20 ENCOUNTER — APPOINTMENT (OUTPATIENT)
Dept: INTERNAL MEDICINE | Facility: CLINIC | Age: 36
End: 2021-09-20
Payer: MEDICAID

## 2021-09-20 PROCEDURE — 96372 THER/PROPH/DIAG INJ SC/IM: CPT

## 2021-09-20 RX ORDER — CYANOCOBALAMIN 1000 UG/ML
1000 INJECTION INTRAMUSCULAR; SUBCUTANEOUS
Qty: 0 | Refills: 0 | Status: COMPLETED | OUTPATIENT
Start: 2021-09-20

## 2021-09-20 RX ADMIN — CYANOCOBALAMIN 0 MCG/ML: 1000 INJECTION INTRAMUSCULAR; SUBCUTANEOUS at 00:00

## 2021-10-12 ENCOUNTER — APPOINTMENT (OUTPATIENT)
Dept: FAMILY MEDICINE | Facility: CLINIC | Age: 36
End: 2021-10-12
Payer: MEDICAID

## 2021-10-12 VITALS
DIASTOLIC BLOOD PRESSURE: 80 MMHG | HEART RATE: 88 BPM | WEIGHT: 217 LBS | HEIGHT: 61 IN | OXYGEN SATURATION: 98 % | BODY MASS INDEX: 40.97 KG/M2 | SYSTOLIC BLOOD PRESSURE: 128 MMHG | TEMPERATURE: 98 F

## 2021-10-12 PROCEDURE — 36415 COLL VENOUS BLD VENIPUNCTURE: CPT

## 2021-10-12 PROCEDURE — 99213 OFFICE O/P EST LOW 20 MIN: CPT | Mod: 25

## 2021-10-13 LAB
25(OH)D3 SERPL-MCNC: 20.7 NG/ML
ALBUMIN SERPL ELPH-MCNC: 3.9 G/DL
ALP BLD-CCNC: 83 U/L
ALT SERPL-CCNC: 18 U/L
ANION GAP SERPL CALC-SCNC: 11 MMOL/L
AST SERPL-CCNC: 14 U/L
BASOPHILS # BLD AUTO: 0.05 K/UL
BASOPHILS NFR BLD AUTO: 0.3 %
BILIRUB SERPL-MCNC: 0.4 MG/DL
BUN SERPL-MCNC: 14 MG/DL
CALCIUM SERPL-MCNC: 8.6 MG/DL
CHLORIDE SERPL-SCNC: 105 MMOL/L
CO2 SERPL-SCNC: 24 MMOL/L
CREAT SERPL-MCNC: 0.87 MG/DL
EOSINOPHIL # BLD AUTO: 0.24 K/UL
EOSINOPHIL NFR BLD AUTO: 1.6 %
FOLATE SERPL-MCNC: 3.9 NG/ML
GLUCOSE SERPL-MCNC: 104 MG/DL
HCG SERPL-MCNC: <1 MIU/ML
HCT VFR BLD CALC: 41.1 %
HGB BLD-MCNC: 12.8 G/DL
IMM GRANULOCYTES NFR BLD AUTO: 0.5 %
LYMPHOCYTES # BLD AUTO: 2.89 K/UL
LYMPHOCYTES NFR BLD AUTO: 19.7 %
MAN DIFF?: NORMAL
MCHC RBC-ENTMCNC: 27.4 PG
MCHC RBC-ENTMCNC: 31.1 GM/DL
MCV RBC AUTO: 88 FL
MONOCYTES # BLD AUTO: 0.74 K/UL
MONOCYTES NFR BLD AUTO: 5 %
NEUTROPHILS # BLD AUTO: 10.66 K/UL
NEUTROPHILS NFR BLD AUTO: 72.9 %
PLATELET # BLD AUTO: 353 K/UL
POTASSIUM SERPL-SCNC: 4.4 MMOL/L
PROT SERPL-MCNC: 6.6 G/DL
RBC # BLD: 4.67 M/UL
RBC # FLD: 15.2 %
SODIUM SERPL-SCNC: 140 MMOL/L
T4 FREE SERPL-MCNC: 1.2 NG/DL
TSH SERPL-ACNC: 2.59 UIU/ML
VIT B12 SERPL-MCNC: 467 PG/ML
WBC # FLD AUTO: 14.66 K/UL

## 2021-10-13 NOTE — HISTORY OF PRESENT ILLNESS
[FreeTextEntry8] : 36 year old female with pmh of papillary carcinoma of thyroid s/p partial thyroidectomy, B12 deficiency (on B12 monthly inj) presenting with complaints of dizziness and nausea for 2weeks. She vomited once today. She describes the dizziness as intermittent head spinning not associated with any activities, positional changes, or triggered by anything. She's had the nausea with the dizziness and without it, also didn't feel like anything specific is triggering it. Her last LMP was beginning of September, she is sexually active. Her menstrual period used to last 3 days but now last 1-2 days only and is very light. She's unsure if she's pregnant. She also states having reflux problems with metallic/weird taste in her mouth on some mornings. Denies any other symptoms at this time.

## 2021-10-25 ENCOUNTER — APPOINTMENT (OUTPATIENT)
Dept: INTERNAL MEDICINE | Facility: CLINIC | Age: 36
End: 2021-10-25

## 2021-11-15 ENCOUNTER — APPOINTMENT (OUTPATIENT)
Dept: INTERNAL MEDICINE | Facility: CLINIC | Age: 36
End: 2021-11-15
Payer: MEDICAID

## 2021-11-15 PROCEDURE — 96372 THER/PROPH/DIAG INJ SC/IM: CPT

## 2021-11-15 RX ORDER — CYANOCOBALAMIN 1000 UG/ML
1000 INJECTION INTRAMUSCULAR; SUBCUTANEOUS
Qty: 0 | Refills: 0 | Status: COMPLETED | OUTPATIENT
Start: 2021-11-15

## 2021-11-29 ENCOUNTER — NON-APPOINTMENT (OUTPATIENT)
Age: 36
End: 2021-11-29

## 2021-12-27 ENCOUNTER — APPOINTMENT (OUTPATIENT)
Dept: INTERNAL MEDICINE | Facility: CLINIC | Age: 36
End: 2021-12-27
Payer: MEDICAID

## 2021-12-27 PROCEDURE — 96372 THER/PROPH/DIAG INJ SC/IM: CPT

## 2021-12-27 RX ORDER — CYANOCOBALAMIN 1000 UG/ML
1000 INJECTION INTRAMUSCULAR; SUBCUTANEOUS
Qty: 0 | Refills: 0 | Status: COMPLETED | OUTPATIENT
Start: 2021-12-27

## 2021-12-27 RX ADMIN — CYANOCOBALAMIN 1 MCG/ML: 1000 INJECTION, SOLUTION INTRAMUSCULAR at 00:00

## 2022-01-10 ENCOUNTER — RX RENEWAL (OUTPATIENT)
Age: 37
End: 2022-01-10

## 2022-01-12 ENCOUNTER — APPOINTMENT (OUTPATIENT)
Dept: ENDOCRINOLOGY | Facility: CLINIC | Age: 37
End: 2022-01-12
Payer: MEDICAID

## 2022-01-12 PROCEDURE — 99213 OFFICE O/P EST LOW 20 MIN: CPT

## 2022-01-12 NOTE — HISTORY OF PRESENT ILLNESS
[FreeTextEntry1] : Jan 12, 2022       iPhone\par \par PCP:  Dr. Jamari Fitzgerald/Juanita Guaman\par \par CC:  9/7/17:  TSH 0.09 ([facility]); thyrotropin receptor ab 3.29 (0.0 - 1.75);   (0-139) \par         10/15/17:  24 hr RAIU 13 %, uptake confined almost entirely to R lobe, site of the 9.7 mm hypoechoic nodule\par         2/15/19:  FNAB R lobe nodule:  [place] VI: papillary thyroid cancer\par         4/11/19:  R lobectomy, Dr. Perez, papillary thyroid carcinoma, follicular variant, infiltrative** (not NIFTP).\par \par 35 yo on B12, vitamin D, but has not required any thyroid hormone post R lobectomy by Dr. Perez - 10 mm papillary thyroid cancer, infiltrative, found.\par \par Impression:  Euthyroid despite hemithyroidectomy.  Small papillary thyroid cancer remains under surveillance.  \par \par Plan:   TFTs at [facility], US thyroid and ROV in June 2022\par \par Jun 29, 2020    in person\par \par PCP:  Dr. Cecil Scruggs/Juanita Guaman\par \par CC:  9/7/17:  TSH 0.09 ([facility]); thyrotropin receptor ab 3.29 (0.0 - 1.75);   (0-139) \par         10/15/17:  24 hr RAIU 13 %, uptake confined almost entirely to R lobe, site of the 9.7 mm hypoechoic nodule\par         2/15/19:  FNAB R lobe nodule:  [place] VI: papillary thyroid cancer\par         4/11/19:  R lobectomy, Dr. Perez, papillary thyroid carcinoma, follicular variant, infiltrative** (not NIFTP).\par \par Now euthyroid.\par On no thyroid hormone Rx s/p R lobectomy for 1 cm follicular variant of papillary thyroid cancer, infiltrative.** (not NIFTP**)\par \par Impression:  Has not required thyroid hormone; however, will likely start eventually.\par Being monitored by US, labs, exam\par \par Plan: Labs today.  In future, repeat TSI, TBII. \par Updated US neck early December (with labs again then) and ROV within a week of \par \par **NIFTP:\par Noninvasive follicular thyroid neoplasm with papillary-like nuclear features is an encapsulated or clearly delimited, noninvasive neoplasm with a follicular growth pattern and nuclear features of papillary thyroid carcinoma . It is considered a 'pre-malignant' lesion of the BIJAN-like group.\par \par \par \par Jan 31, 2020\par \par PCP:  Dr. Cecil Scruggs/Juanita Guaman\par \par CC:  R thyroid nodule  (?hot on scan)->  4/11/19 Dr. Perez:  10 mm papillary thyroid carcinoma, follicular variant, infiltrative.\par         Previously low TSH\par         Elevated BMI\par          ?panic attacks -> [facility]\par \par 35 yo with previously low TSH, resolved after R lobectomy 4/2019 by Dr. Perez at which time 10 mm infiltrative papillary thyroid cancer removed.\par \par 9/12/2019 Tg 13, same as May 2019\par TSH 3.12 on no Rx.  \par \par US:  reassuring; prominent cervical lymph node.\par \par Plan:  Updated labs today.\par Start LT4 25 mcg daily if TSH elevated.  \par Updated labs before July visit.\par Check vit D \par Continued suveillance of thyroid bed, cervical lymph node, TFTs, Tg level.   \par   \par \par \par \par \par September 11, 2019\par PCP:  Dr. Cecil Scruggs/Juanita Guaman\par \par CC:  R thyroid nodule  (?hot on scan)->  4/11/19 Dr. Perez:  10 mm papillary thyroid carcinoma, follicular variant, infiltrative.\par         Previously low TSH\par         Elevated BMI\par \par \par FINAL DIAGNOSIS\par  \par Thyroid, right lobe and isthmus, lobectomy:\par 	PAPILLARY THYROID CARCINOMA, FOLLICULAR VARIANT,  INFILTRATIVE.\par 	Tumor size: 10 mm\par 	All margins are uninvolved by tumor; closest margin is anterior (0.5 mm), at site \par 	   of prior biopsy.\par 	One unremarkable parathyroid gland.\par 	No lymph nodes are identified.\par 	Pathologic Stage: pT1a NX.\par See Synoptic Report.\par \par Synoptic Report (Cancer Protocol of  February, 2019, of the College of \par 	American [employer]ologists):\par Procedure: Right lobectomy\par 	Tumor focality: Unifocal\par 	Tumor site: Right lobe\par 	Tumor size, greatest dimension: 10 mm \par 	Histologic type: Papillary carcinoma, follicular variant, infiltrative.\par 	Margins: Uninvolved by carcinoma.\par 		   Distance from closest margin: 0.5  mm from the inked and cauterized \par 		       anterior margin, at the site of prior biopsy, which there are reactive \par 	 	       changes.\par 	Angioinvasion: Not identified\par 	Lymphatic invasion: Not identified.\par 	Perineural invasion: Not identified.\par 	Mitotic rate:   Less than 1 per 2 square millimeters\par 	Extrathyroidal extension: Not identified.\par 	Regional lymph nodes: No lymph nodes submitted or found.\par 	Pathologic Stage Classification: pT1a NX\par \par \par Lobectomy on\par Labs at [facility] on\par 5/17 iincluded\par Tg 13\par TSH 3.39\par \par Plan:  Labs today.\par US and\par ROV January\par \par \par \par \par May 17, 2019\par PCP:  Dr. Cecil Scruggs/Juanita Guaman\par \par CC:  R thyroid nodule  (?hot on scan)\par         Previously low TSH\par         Elevated BMI\par \par Underwent surgical removal of isthmus and R thyroid lobe for 10 mm papillary thyroid carcinoma, follicular variant, infiltrative.\par Feels well.\par Wants to lose weight.\par Has f/u appt to see Dr. Perez end of May.\par \par Plan:  TFTs, Tg today.\par ROV in August.\par \par \par February 21, 2019\par \par PCP:  Dr. Cecil Scruggs/Juanita Guaman\par \par CC:  R thyroid nodule  (?hot on scan)\par         Low TSH\par         Elevated BMI\par \par In course of evaluation for elevated BMI, TFTs obtained and TSH was low, TSI slightly elevated,\par ultrasound thyroid showed ~1 cm R thyroid nodule, thyroid scan interpreted as likely "hot" nodule\par with some suppression of rest of gland.    \par TSH went back to normal.\par Follow up ultrasound advised FNAB which is read as positive for papillary thyroid cancer.\par She will meet with Dr. Perez for his advice in the near future.  \par         \par         \par \par \par \par \par January 18, 2019\par \par PCP:  Dr. Cecil Scruggs\par \par 1.  Hyperthyroidism\par      Lab tests at [facility] on\par   10/11/2018 included\par    TSH 0.58\par    T3 106   (had been 0.09  on 9/7/2017 at which time TSI was slightly elevated at 144 (0-139) \par \par     Recent [facility] labs showed normal TSH.   \par 2.  Multinodular thyroid.   Went for follow up ultrasound of thyroid at [facility]:\par      " CLINICAL HISTORY: Toxic nodular goiter \par  \par  COMPARISON: 9/7/2017 \par  \par  FINDINGS: Thyroid isthmus heterogeneous unchanged measures 3.2 mm. \par  \par  Right lobe of the thyroid mildly heterogeneous measures 5.1 x 1.3 x 1.5 cm and contains a \par hypoechoic nodule in the mid pole 9.7 x 8.5 x 7.9 mm similar to the prior ultrasound. \par  \par  Left lobe of the thyroid measures 4.2 x 1 x 1.5 cm mildly heterogeneous without focal lesion. \par  \par  \par  IMPRESSION: Although there is no significant interval change, ultrasound guided needle \par aspiration/biopsy is again recommended for the hypoechoic nodule in the right mid lobe. \par  \par  \par ***Electronically Signed *** \par ----------------------------------------------- \par Osmani Arreguin MD   "\par \par        Will request that she return to [facility] for FNAB after authorization from her insurance company.  \par \par ROV in May. \par \par \par \par     \par \par \par \par \par Previous notes from [employer] appended below.\par \par October 11, 2018\par            .\par            32 yo evaluated by Dr. Umana for multinodular thyroid with hyperthyroidism associated with elevated TSI. FNAB at [facility] reassuring. TSH has returned into normal range.\par            Other issues include elevated BMI\par            .\par            Plan: 1. Continued surveillance of thyroid nodules by means of history, physical and follow up ultrasound.\par            2. Continued monitroing of previous hyperthyroidism apparently due to Grave's disease. .\par            3. Further evaluation of elevated BMI.

## 2022-01-31 ENCOUNTER — APPOINTMENT (OUTPATIENT)
Dept: FAMILY MEDICINE | Facility: CLINIC | Age: 37
End: 2022-01-31
Payer: MEDICAID

## 2022-01-31 PROCEDURE — 96372 THER/PROPH/DIAG INJ SC/IM: CPT

## 2022-01-31 RX ADMIN — CYANOCOBALAMIN 0 MCG/ML: 1000 INJECTION INTRAMUSCULAR; SUBCUTANEOUS at 00:00

## 2022-02-04 ENCOUNTER — APPOINTMENT (OUTPATIENT)
Dept: FAMILY MEDICINE | Facility: CLINIC | Age: 37
End: 2022-02-04

## 2022-02-07 ENCOUNTER — APPOINTMENT (OUTPATIENT)
Dept: OBGYN | Facility: CLINIC | Age: 37
End: 2022-02-07

## 2022-02-11 ENCOUNTER — APPOINTMENT (OUTPATIENT)
Dept: FAMILY MEDICINE | Facility: CLINIC | Age: 37
End: 2022-02-11
Payer: MEDICAID

## 2022-02-11 VITALS
DIASTOLIC BLOOD PRESSURE: 72 MMHG | WEIGHT: 216 LBS | RESPIRATION RATE: 18 BRPM | HEIGHT: 61 IN | HEART RATE: 94 BPM | OXYGEN SATURATION: 100 % | SYSTOLIC BLOOD PRESSURE: 122 MMHG | TEMPERATURE: 98.1 F | BODY MASS INDEX: 40.78 KG/M2

## 2022-02-11 PROCEDURE — 36415 COLL VENOUS BLD VENIPUNCTURE: CPT

## 2022-02-11 PROCEDURE — 99395 PREV VISIT EST AGE 18-39: CPT | Mod: 25

## 2022-02-11 NOTE — HEALTH RISK ASSESSMENT
[Good] : ~his/her~  mood as  good [Never] : Never [Yes] : Yes [Monthly or less (1 pt)] : Monthly or less (1 point) [1 or 2 (0 pts)] : 1 or 2 (0 points) [Never (0 pts)] : Never (0 points) [No] : In the past 12 months have you used drugs other than those required for medical reasons? No [No falls in past year] : Patient reported no falls in the past year [0] : 2) Feeling down, depressed, or hopeless: Not at all (0) [PHQ-2 Negative - No further assessment needed] : PHQ-2 Negative - No further assessment needed [HIV Test offered] : HIV Test offered [Hepatitis C test offered] : Hepatitis C test offered [None] : None [With Family] : lives with family [# of Members in Household ___] :  household currently consist of [unfilled] member(s) [Employed] : employed [Significant Other] : lives with significant other [# Of Children ___] : has [unfilled] children [Sexually Active] : sexually active [Feels Safe at Home] : Feels safe at home [Fully functional (bathing, dressing, toileting, transferring, walking, feeding)] : Fully functional (bathing, dressing, toileting, transferring, walking, feeding) [Fully functional (using the telephone, shopping, preparing meals, housekeeping, doing laundry, using] : Fully functional and needs no help or supervision to perform IADLs (using the telephone, shopping, preparing meals, housekeeping, doing laundry, using transportation, managing medications and managing finances) [Audit-CScore] : 1 [de-identified] : Physically active [de-identified] : Eat healthy  [ULD8Hgfiz] : 0 [Patient reported PAP Smear was normal] : Patient reported PAP Smear was normal [Change in mental status noted] : No change in mental status noted [Language] : denies difficulty with language [High Risk Behavior] : no high risk behavior [Reports changes in hearing] : Reports no changes in hearing [Reports changes in vision] : Reports no changes in vision [Reports changes in dental health] : Reports no changes in dental health [PapSmearDate] : 01/21 [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

## 2022-02-11 NOTE — PHYSICAL EXAM
[No Acute Distress] : no acute distress [Well Nourished] : well nourished [Well Developed] : well developed [Well-Appearing] : well-appearing [Normal Sclera/Conjunctiva] : normal sclera/conjunctiva [PERRL] : pupils equal round and reactive to light [EOMI] : extraocular movements intact [Normal Outer Ear/Nose] : the outer ears and nose were normal in appearance [Normal Oropharynx] : the oropharynx was normal [No JVD] : no jugular venous distention [No Lymphadenopathy] : no lymphadenopathy [Supple] : supple [Thyroid Normal, No Nodules] : the thyroid was normal and there were no nodules present [No Respiratory Distress] : no respiratory distress  [No Accessory Muscle Use] : no accessory muscle use [Clear to Auscultation] : lungs were clear to auscultation bilaterally [Normal Rate] : normal rate  [Regular Rhythm] : with a regular rhythm [Normal S1, S2] : normal S1 and S2 [No Murmur] : no murmur heard [No Carotid Bruits] : no carotid bruits [No Abdominal Bruit] : a ~M bruit was not heard ~T in the abdomen [No Varicosities] : no varicosities [Pedal Pulses Present] : the pedal pulses are present [No Edema] : there was no peripheral edema [No Palpable Aorta] : no palpable aorta [No Extremity Clubbing/Cyanosis] : no extremity clubbing/cyanosis [Soft] : abdomen soft [Non Tender] : non-tender [Non-distended] : non-distended [No Masses] : no abdominal mass palpated [No HSM] : no HSM [Normal Bowel Sounds] : normal bowel sounds [Normal Posterior Cervical Nodes] : no posterior cervical lymphadenopathy [Normal Anterior Cervical Nodes] : no anterior cervical lymphadenopathy [No CVA Tenderness] : no CVA  tenderness [No Spinal Tenderness] : no spinal tenderness [No Joint Swelling] : no joint swelling [Grossly Normal Strength/Tone] : grossly normal strength/tone [No Rash] : no rash [Coordination Grossly Intact] : coordination grossly intact [No Focal Deficits] : no focal deficits [Normal Gait] : normal gait [Deep Tendon Reflexes (DTR)] : deep tendon reflexes were 2+ and symmetric [Normal Affect] : the affect was normal [Normal Insight/Judgement] : insight and judgment were intact [de-identified] : wax on the right ear

## 2022-02-11 NOTE — HISTORY OF PRESENT ILLNESS
[FreeTextEntry1] : Physical exam  [de-identified] : 36 year old female with pmh of papillary carcinoma of thyroid s/p partial thyroidectomy, B12 deficiency (on B12 monthly inj), vitamin D deficiency presenting for an annual physical exam. Patient states she is doing well but noticed only 1 day of period last month and is barely spotting this month. SHe would like get tested for pregnancy as she is trying to get pregnant.

## 2022-02-11 NOTE — ASSESSMENT
[FreeTextEntry1] : 36 year old female presented for an annual physical exam\par routine blood work \par Will call back with results

## 2022-02-14 LAB
25(OH)D3 SERPL-MCNC: 29.6 NG/ML
ALBUMIN SERPL ELPH-MCNC: 4.3 G/DL
ALP BLD-CCNC: 92 U/L
ALT SERPL-CCNC: 17 U/L
ANION GAP SERPL CALC-SCNC: 13 MMOL/L
AST SERPL-CCNC: 13 U/L
BASOPHILS # BLD AUTO: 0.04 K/UL
BASOPHILS NFR BLD AUTO: 0.3 %
BILIRUB SERPL-MCNC: 0.5 MG/DL
BUN SERPL-MCNC: 11 MG/DL
CALCIUM SERPL-MCNC: 9.1 MG/DL
CHLORIDE SERPL-SCNC: 104 MMOL/L
CHOLEST SERPL-MCNC: 250 MG/DL
CO2 SERPL-SCNC: 24 MMOL/L
CREAT SERPL-MCNC: 0.85 MG/DL
EOSINOPHIL # BLD AUTO: 0.24 K/UL
EOSINOPHIL NFR BLD AUTO: 1.7 %
ESTIMATED AVERAGE GLUCOSE: 114 MG/DL
FOLATE SERPL-MCNC: >20 NG/ML
GLUCOSE SERPL-MCNC: 89 MG/DL
HBA1C MFR BLD HPLC: 5.6 %
HCG SERPL-MCNC: <1 MIU/ML
HCT VFR BLD CALC: 42 %
HCV AB SER QL: NONREACTIVE
HCV S/CO RATIO: 0.12 S/CO
HDLC SERPL-MCNC: 53 MG/DL
HGB BLD-MCNC: 13 G/DL
HIV1+2 AB SPEC QL IA.RAPID: NONREACTIVE
IMM GRANULOCYTES NFR BLD AUTO: 0.4 %
LDLC SERPL CALC-MCNC: 159 MG/DL
LYMPHOCYTES # BLD AUTO: 3.59 K/UL
LYMPHOCYTES NFR BLD AUTO: 25.7 %
MAN DIFF?: NORMAL
MCHC RBC-ENTMCNC: 26.6 PG
MCHC RBC-ENTMCNC: 31 GM/DL
MCV RBC AUTO: 86.1 FL
MONOCYTES # BLD AUTO: 0.84 K/UL
MONOCYTES NFR BLD AUTO: 6 %
NEUTROPHILS # BLD AUTO: 9.22 K/UL
NEUTROPHILS NFR BLD AUTO: 65.9 %
NONHDLC SERPL-MCNC: 197 MG/DL
PLATELET # BLD AUTO: 359 K/UL
POTASSIUM SERPL-SCNC: 3.8 MMOL/L
PROT SERPL-MCNC: 7.3 G/DL
RBC # BLD: 4.88 M/UL
RBC # FLD: 15 %
SODIUM SERPL-SCNC: 141 MMOL/L
T4 SERPL-MCNC: 8.8 UG/DL
TRIGL SERPL-MCNC: 193 MG/DL
TSH SERPL-ACNC: 3.31 UIU/ML
VIT B12 SERPL-MCNC: 650 PG/ML
WBC # FLD AUTO: 13.98 K/UL

## 2022-04-06 ENCOUNTER — APPOINTMENT (OUTPATIENT)
Dept: FAMILY MEDICINE | Facility: CLINIC | Age: 37
End: 2022-04-06
Payer: MEDICAID

## 2022-04-06 VITALS
HEIGHT: 61 IN | TEMPERATURE: 99.2 F | HEART RATE: 92 BPM | BODY MASS INDEX: 40.78 KG/M2 | DIASTOLIC BLOOD PRESSURE: 94 MMHG | OXYGEN SATURATION: 95 % | SYSTOLIC BLOOD PRESSURE: 144 MMHG | WEIGHT: 216 LBS | RESPIRATION RATE: 18 BRPM

## 2022-04-06 DIAGNOSIS — J06.9 ACUTE UPPER RESPIRATORY INFECTION, UNSPECIFIED: ICD-10-CM

## 2022-04-06 PROCEDURE — 96372 THER/PROPH/DIAG INJ SC/IM: CPT

## 2022-04-06 PROCEDURE — 99213 OFFICE O/P EST LOW 20 MIN: CPT | Mod: 25

## 2022-04-06 RX ORDER — CHOLECALCIFEROL (VITAMIN D3) 1250 MCG
1.25 MG CAPSULE ORAL
Qty: 8 | Refills: 0 | Status: COMPLETED | COMMUNITY
Start: 2021-10-13 | End: 2022-04-06

## 2022-04-06 RX ADMIN — CYANOCOBALAMIN 0 MCG/ML: 1000 INJECTION INTRAMUSCULAR; SUBCUTANEOUS at 00:00

## 2022-04-06 NOTE — HISTORY OF PRESENT ILLNESS
[FreeTextEntry8] : 36 year old female with pmh of papillary carcinoma of thyroid s/p partial thyroidectomy, B12 deficiency (on B12 monthly inj), vitamin D deficiency presenting cold symptoms for a week. Patient reports she walked in the cold and since then has had dry cough, runny nose, burning eyes and fever. The fever has improved today. The cough is worst at night and patient reports she hasn't been able to sleep at all. She took over the counter medication which hasn't helped. She is not vaccinated for covid/flu. Patient is upset today and is in tears due to problems with her boyfriend. She has been trying to get pregnant (she has a 4yo daughter) but is unable which is upsetting her boyfriend who thinks she doesn't want to get pregnant. She also lost her job recently and has been unable to get another job yet. She denies being depressed but is anxious.

## 2022-04-08 LAB
INFLUENZA A RESULT: NOT DETECTED
INFLUENZA B RESULT: NOT DETECTED
RESP SYN VIRUS RESULT: NOT DETECTED
SARS-COV-2 RESULT: NOT DETECTED

## 2022-04-20 ENCOUNTER — NON-APPOINTMENT (OUTPATIENT)
Age: 37
End: 2022-04-20

## 2022-04-20 ENCOUNTER — APPOINTMENT (OUTPATIENT)
Dept: FAMILY MEDICINE | Facility: CLINIC | Age: 37
End: 2022-04-20
Payer: MEDICAID

## 2022-04-20 VITALS
OXYGEN SATURATION: 98 % | TEMPERATURE: 99.1 F | DIASTOLIC BLOOD PRESSURE: 78 MMHG | HEIGHT: 61 IN | BODY MASS INDEX: 40.78 KG/M2 | WEIGHT: 216 LBS | HEART RATE: 78 BPM | RESPIRATION RATE: 18 BRPM | SYSTOLIC BLOOD PRESSURE: 118 MMHG

## 2022-04-20 LAB — HCG UR QL: NEGATIVE

## 2022-04-20 PROCEDURE — 99214 OFFICE O/P EST MOD 30 MIN: CPT | Mod: 25

## 2022-04-20 PROCEDURE — 81025 URINE PREGNANCY TEST: CPT

## 2022-04-20 PROCEDURE — 36415 COLL VENOUS BLD VENIPUNCTURE: CPT

## 2022-04-22 LAB — HCG SERPL-MCNC: <1 MIU/ML

## 2022-04-24 NOTE — HISTORY OF PRESENT ILLNESS
[FreeTextEntry1] : Follow up  [de-identified] : 36 year old female with pmh of papillary carcinoma of thyroid s/p partial thyroidectomy, B12 deficiency (on B12 monthly inj), vitamin D deficiency and anxiety presenting for a follow up. Patient reports her anxiety has been worsening. She's always had anxiety but never was on meds. She currently is undergoing a lot of stress at home with her significant other and with losing her job. She would like to start medication for anxiety. Patient also reports she is 2 weeks late for her period and would like to be tested for pregnancy.

## 2022-04-24 NOTE — PHYSICAL EXAM
[Normal Outer Ear/Nose] : the outer ears and nose were normal in appearance [Normal] : affect was normal and insight and judgment were intact

## 2022-05-04 ENCOUNTER — APPOINTMENT (OUTPATIENT)
Dept: INTERNAL MEDICINE | Facility: CLINIC | Age: 37
End: 2022-05-04
Payer: MEDICAID

## 2022-05-04 ENCOUNTER — MED ADMIN CHARGE (OUTPATIENT)
Age: 37
End: 2022-05-04

## 2022-05-04 PROCEDURE — 96372 THER/PROPH/DIAG INJ SC/IM: CPT

## 2022-05-04 RX ORDER — CYANOCOBALAMIN 1000 UG/ML
1000 INJECTION INTRAMUSCULAR; SUBCUTANEOUS
Qty: 0 | Refills: 0 | Status: COMPLETED | OUTPATIENT
Start: 2022-05-03

## 2022-05-23 ENCOUNTER — APPOINTMENT (OUTPATIENT)
Dept: FAMILY MEDICINE | Facility: CLINIC | Age: 37
End: 2022-05-23

## 2022-06-15 ENCOUNTER — APPOINTMENT (OUTPATIENT)
Dept: INTERNAL MEDICINE | Facility: CLINIC | Age: 37
End: 2022-06-15
Payer: MEDICAID

## 2022-06-15 PROCEDURE — 96372 THER/PROPH/DIAG INJ SC/IM: CPT

## 2022-06-15 RX ORDER — CYANOCOBALAMIN 1000 UG/ML
1000 INJECTION INTRAMUSCULAR; SUBCUTANEOUS
Qty: 0 | Refills: 0 | Status: COMPLETED | OUTPATIENT
Start: 2022-06-13

## 2022-06-17 ENCOUNTER — APPOINTMENT (OUTPATIENT)
Dept: FAMILY MEDICINE | Facility: CLINIC | Age: 37
End: 2022-06-17
Payer: MEDICAID

## 2022-06-17 VITALS
TEMPERATURE: 98.3 F | DIASTOLIC BLOOD PRESSURE: 90 MMHG | BODY MASS INDEX: 39.65 KG/M2 | SYSTOLIC BLOOD PRESSURE: 140 MMHG | WEIGHT: 210 LBS | HEIGHT: 61 IN | OXYGEN SATURATION: 97 % | HEART RATE: 83 BPM

## 2022-06-17 DIAGNOSIS — Z23 ENCOUNTER FOR IMMUNIZATION: ICD-10-CM

## 2022-06-17 DIAGNOSIS — F41.9 ANXIETY DISORDER, UNSPECIFIED: ICD-10-CM

## 2022-06-17 PROCEDURE — 36415 COLL VENOUS BLD VENIPUNCTURE: CPT

## 2022-06-17 PROCEDURE — 99213 OFFICE O/P EST LOW 20 MIN: CPT | Mod: 25

## 2022-06-21 LAB
25(OH)D3 SERPL-MCNC: 27.1 NG/ML
ALBUMIN SERPL ELPH-MCNC: 4.1 G/DL
ALP BLD-CCNC: 90 U/L
ALT SERPL-CCNC: 17 U/L
ANION GAP SERPL CALC-SCNC: 13 MMOL/L
AST SERPL-CCNC: 12 U/L
BASOPHILS # BLD AUTO: 0.04 K/UL
BASOPHILS NFR BLD AUTO: 0.3 %
BILIRUB SERPL-MCNC: 0.5 MG/DL
BUN SERPL-MCNC: 11 MG/DL
CALCIUM SERPL-MCNC: 9 MG/DL
CHLORIDE SERPL-SCNC: 104 MMOL/L
CHOLEST SERPL-MCNC: 204 MG/DL
CO2 SERPL-SCNC: 21 MMOL/L
CREAT SERPL-MCNC: 1.05 MG/DL
EGFR: 70 ML/MIN/1.73M2
EOSINOPHIL # BLD AUTO: 0.17 K/UL
EOSINOPHIL NFR BLD AUTO: 1.1 %
FOLATE SERPL-MCNC: 5.2 NG/ML
GLUCOSE SERPL-MCNC: 74 MG/DL
HCT VFR BLD CALC: 44 %
HDLC SERPL-MCNC: 46 MG/DL
HGB BLD-MCNC: 12.9 G/DL
IMM GRANULOCYTES NFR BLD AUTO: 0.6 %
LDLC SERPL CALC-MCNC: 125 MG/DL
LYMPHOCYTES # BLD AUTO: 3.74 K/UL
LYMPHOCYTES NFR BLD AUTO: 24.9 %
MAN DIFF?: NORMAL
MCHC RBC-ENTMCNC: 26.8 PG
MCHC RBC-ENTMCNC: 29.3 GM/DL
MCV RBC AUTO: 91.3 FL
MONOCYTES # BLD AUTO: 0.63 K/UL
MONOCYTES NFR BLD AUTO: 4.2 %
NEUTROPHILS # BLD AUTO: 10.32 K/UL
NEUTROPHILS NFR BLD AUTO: 68.9 %
NONHDLC SERPL-MCNC: 157 MG/DL
PLATELET # BLD AUTO: 398 K/UL
POTASSIUM SERPL-SCNC: 4.4 MMOL/L
PROT SERPL-MCNC: 7 G/DL
RBC # BLD: 4.82 M/UL
RBC # FLD: 15.7 %
SODIUM SERPL-SCNC: 139 MMOL/L
T4 SERPL-MCNC: 7.3 UG/DL
TRIGL SERPL-MCNC: 163 MG/DL
TSH SERPL-ACNC: 2.18 UIU/ML
VIT B12 SERPL-MCNC: 928 PG/ML
WBC # FLD AUTO: 14.99 K/UL

## 2022-06-22 PROBLEM — F41.9 ANXIETY: Status: ACTIVE | Noted: 2022-04-20

## 2022-06-24 NOTE — ASSESSMENT
[FreeTextEntry1] : 36 year old female presenting with anxiety, depression, dizziness, fatigue. \par Discussed in great detail with patient regarding the cause of this. \par blood collected in the office to check for anemia, thyroid, vitamin deficiency that may be contribute to these symptoms. \par Recommended she starts taking medication and sees a therapist - list given. \par Recommended to have 3 meals a day and have healthy snacks in between so to make sure not to feel dizzy. \par Patient to f/u in 1 month\par will call back with results

## 2022-06-24 NOTE — HISTORY OF PRESENT ILLNESS
[FreeTextEntry1] : follow up  [de-identified] : 36 year old female presenting with fatigue, dizziness, decreased appetite for a month. Patient reports she started a new job and has been eating less, doesn't have any energy and just wants to sleep. She was given lexapro in the past for anxiety and depression, however, she didn’t start taking it but wants to see if she can get over it herself.

## 2022-07-14 ENCOUNTER — APPOINTMENT (OUTPATIENT)
Dept: INTERNAL MEDICINE | Facility: CLINIC | Age: 37
End: 2022-07-14

## 2022-07-14 PROCEDURE — 36415 COLL VENOUS BLD VENIPUNCTURE: CPT

## 2022-07-14 PROCEDURE — 96372 THER/PROPH/DIAG INJ SC/IM: CPT

## 2022-07-14 RX ORDER — CYANOCOBALAMIN 1000 UG/ML
1000 INJECTION INTRAMUSCULAR; SUBCUTANEOUS
Qty: 0 | Refills: 0 | Status: COMPLETED | OUTPATIENT
Start: 2022-07-13

## 2022-07-15 LAB — HCG SERPL-MCNC: <1 MIU/ML

## 2022-07-22 ENCOUNTER — APPOINTMENT (OUTPATIENT)
Dept: OBGYN | Facility: CLINIC | Age: 37
End: 2022-07-22

## 2022-07-22 VITALS
WEIGHT: 210 LBS | SYSTOLIC BLOOD PRESSURE: 120 MMHG | DIASTOLIC BLOOD PRESSURE: 80 MMHG | BODY MASS INDEX: 39.65 KG/M2 | HEIGHT: 61 IN

## 2022-07-22 DIAGNOSIS — Z01.419 ENCOUNTER FOR GYNECOLOGICAL EXAMINATION (GENERAL) (ROUTINE) W/OUT ABNORMAL FINDINGS: ICD-10-CM

## 2022-07-22 DIAGNOSIS — Z32.00 ENCOUNTER FOR PREGNANCY TEST, RESULT UNKNOWN: ICD-10-CM

## 2022-07-22 DIAGNOSIS — Z31.69 ENCOUNTER FOR OTHER GENERAL COUNSELING AND ADVICE ON PROCREATION: ICD-10-CM

## 2022-07-22 DIAGNOSIS — Z91.89 OTHER SPECIFIED PERSONAL RISK FACTORS, NOT ELSEWHERE CLASSIFIED: ICD-10-CM

## 2022-07-22 DIAGNOSIS — R10.2 PELVIC AND PERINEAL PAIN: ICD-10-CM

## 2022-07-22 DIAGNOSIS — Z87.42 PERSONAL HISTORY OF OTHER DISEASES OF THE FEMALE GENITAL TRACT: ICD-10-CM

## 2022-07-22 DIAGNOSIS — J30.2 OTHER SEASONAL ALLERGIC RHINITIS: ICD-10-CM

## 2022-07-22 PROCEDURE — 99395 PREV VISIT EST AGE 18-39: CPT

## 2022-07-22 NOTE — PHYSICAL EXAM
[Appropriately responsive] : appropriately responsive [Alert] : alert [No Acute Distress] : no acute distress [No Lymphadenopathy] : no lymphadenopathy [Regular Rate Rhythm] : regular rate rhythm [No Murmurs] : no murmurs [Clear to Auscultation B/L] : clear to auscultation bilaterally [Soft] : soft [Non-tender] : non-tender [Non-distended] : non-distended [No Lesions] : no lesions [No Mass] : no mass [Oriented x3] : oriented x3 [Examination Of The Breasts] : a normal appearance [No Masses] : no breast masses were palpable [Labia Majora] : normal [Labia Minora] : normal [Normal] : normal [Uterine Adnexae] : normal [Declined] : Patient declined rectal exam [FreeTextEntry6] : soft, NT, no masses [FreeTextEntry7] : obese

## 2022-07-22 NOTE — HISTORY OF PRESENT ILLNESS
[Patient reported PAP Smear was normal] : Patient reported PAP Smear was normal [HIV test declined] : HIV test declined [Syphilis test declined] : Syphilis test declined [Gonorrhea test offered] : Gonorrhea test offered [Chlamydia test offered] : Chlamydia test offered [N] : Patient reports normal menses [Y] : Positive pregnancy history [FreeTextEntry1] : Here for annual visit. Has been trying to conceive x 2 years. Reports regular menses and frequent intercourse. Partner has been check and has normal sperm count. Previously spoke with physician about trying Clomid and would like to start with this [PapSmeardate] : 08/2020 [LMPDate] : 7/11/22 [MensesFreq] : 28 [MensesLength] : 4 [MensesAmount] : moderate [PGxTotal] : 1 [[facility] HospitalxFulerm] : 1

## 2022-07-22 NOTE — COUNSELING
[Nutrition/ Exercise/ Weight Management] : nutrition, exercise, weight management [Preconception Care/ Fertility options] : preconception care, fertility options [FreeTextEntry2] : Discussed menstrual cycle and timing of intercourse, s&s of ovulation, Clomid use

## 2022-07-22 NOTE — PLAN
[FreeTextEntry1] : Discussed timing of intercourse with Clomid\par Will Rx x 3 months, if no pregnancy needs to return to see MD\zelalem Recommend ALLEY but patient would like to try Clomid

## 2022-07-25 LAB
C TRACH RRNA SPEC QL NAA+PROBE: NOT DETECTED
N GONORRHOEA RRNA SPEC QL NAA+PROBE: NOT DETECTED
SOURCE AMPLIFICATION: NORMAL

## 2022-07-28 ENCOUNTER — TRANSCRIPTION ENCOUNTER (OUTPATIENT)
Age: 37
End: 2022-07-28

## 2022-09-07 ENCOUNTER — TRANSCRIPTION ENCOUNTER (OUTPATIENT)
Age: 37
End: 2022-09-07

## 2022-09-07 ENCOUNTER — MED ADMIN CHARGE (OUTPATIENT)
Age: 37
End: 2022-09-07

## 2022-09-07 ENCOUNTER — APPOINTMENT (OUTPATIENT)
Dept: INTERNAL MEDICINE | Facility: CLINIC | Age: 37
End: 2022-09-07

## 2022-09-07 PROCEDURE — 96372 THER/PROPH/DIAG INJ SC/IM: CPT

## 2022-09-07 RX ORDER — CYANOCOBALAMIN 1000 UG/ML
1000 INJECTION INTRAMUSCULAR; SUBCUTANEOUS
Qty: 0 | Refills: 0 | Status: COMPLETED | OUTPATIENT
Start: 2022-09-07

## 2022-10-17 ENCOUNTER — APPOINTMENT (OUTPATIENT)
Dept: FAMILY MEDICINE | Facility: CLINIC | Age: 37
End: 2022-10-17

## 2022-10-17 VITALS
TEMPERATURE: 98 F | OXYGEN SATURATION: 94 % | WEIGHT: 210 LBS | HEIGHT: 61 IN | SYSTOLIC BLOOD PRESSURE: 120 MMHG | HEART RATE: 83 BPM | DIASTOLIC BLOOD PRESSURE: 80 MMHG | BODY MASS INDEX: 39.65 KG/M2

## 2022-10-17 DIAGNOSIS — R53.83 OTHER FATIGUE: ICD-10-CM

## 2022-10-17 DIAGNOSIS — R42 DIZZINESS AND GIDDINESS: ICD-10-CM

## 2022-10-17 DIAGNOSIS — H61.21 IMPACTED CERUMEN, RIGHT EAR: ICD-10-CM

## 2022-10-17 PROCEDURE — 99214 OFFICE O/P EST MOD 30 MIN: CPT | Mod: 25

## 2022-10-17 PROCEDURE — 36415 COLL VENOUS BLD VENIPUNCTURE: CPT

## 2022-10-17 RX ORDER — ESCITALOPRAM OXALATE 10 MG/1
10 TABLET ORAL DAILY
Qty: 30 | Refills: 0 | Status: DISCONTINUED | COMMUNITY
Start: 2022-04-20 | End: 2022-10-17

## 2022-10-17 RX ORDER — FOLIC ACID 1 MG/1
1 TABLET ORAL DAILY
Qty: 90 | Refills: 2 | Status: DISCONTINUED | COMMUNITY
Start: 2021-10-13 | End: 2022-10-17

## 2022-10-17 RX ORDER — PROMETHAZINE HYDROCHLORIDE AND CODEINE PHOSPHATE 6.25; 1 MG/5ML; MG/5ML
6.25-1 SOLUTION ORAL EVERY 6 HOURS
Qty: 140 | Refills: 0 | Status: DISCONTINUED | COMMUNITY
Start: 2022-04-06 | End: 2022-10-17

## 2022-10-18 DIAGNOSIS — E53.8 DEFICIENCY OF OTHER SPECIFIED B GROUP VITAMINS: ICD-10-CM

## 2022-10-18 LAB
25(OH)D3 SERPL-MCNC: 18.6 NG/ML
BASOPHILS # BLD AUTO: 0.05 K/UL
BASOPHILS NFR BLD AUTO: 0.3 %
EOSINOPHIL # BLD AUTO: 0.21 K/UL
EOSINOPHIL NFR BLD AUTO: 1.4 %
FOLATE SERPL-MCNC: 4.3 NG/ML
HCG SERPL-MCNC: <1 MIU/ML
HCT VFR BLD CALC: 43.5 %
HGB BLD-MCNC: 13.4 G/DL
IMM GRANULOCYTES NFR BLD AUTO: 0.7 %
LYMPHOCYTES # BLD AUTO: 3.28 K/UL
LYMPHOCYTES NFR BLD AUTO: 22.3 %
MAN DIFF?: NORMAL
MCHC RBC-ENTMCNC: 26.9 PG
MCHC RBC-ENTMCNC: 30.8 GM/DL
MCV RBC AUTO: 87.2 FL
MONOCYTES # BLD AUTO: 0.74 K/UL
MONOCYTES NFR BLD AUTO: 5 %
NEUTROPHILS # BLD AUTO: 10.34 K/UL
NEUTROPHILS NFR BLD AUTO: 70.3 %
PLATELET # BLD AUTO: 370 K/UL
RBC # BLD: 4.99 M/UL
RBC # FLD: 15.8 %
VIT B12 SERPL-MCNC: 438 PG/ML
WBC # FLD AUTO: 14.73 K/UL

## 2022-10-18 RX ORDER — MULTIVIT-MIN/FOLIC/VIT K/LYCOP 400-300MCG
50 MCG TABLET ORAL
Qty: 90 | Refills: 3 | Status: COMPLETED | COMMUNITY
Start: 2021-07-16 | End: 2022-10-18

## 2022-10-19 PROBLEM — R53.83 FATIGUE: Status: ACTIVE | Noted: 2021-07-12

## 2022-10-19 PROBLEM — R42 DIZZINESS: Status: ACTIVE | Noted: 2021-07-12

## 2022-10-19 NOTE — PHYSICAL EXAM
[No Acute Distress] : no acute distress [Well Nourished] : well nourished [Well Developed] : well developed [Normal Sclera/Conjunctiva] : normal sclera/conjunctiva [Well-Appearing] : well-appearing [PERRL] : pupils equal round and reactive to light [EOMI] : extraocular movements intact [Normal Outer Ear/Nose] : the outer ears and nose were normal in appearance [Normal Oropharynx] : the oropharynx was normal [No Lymphadenopathy] : no lymphadenopathy [No JVD] : no jugular venous distention [Supple] : supple [Thyroid Normal, No Nodules] : the thyroid was normal and there were no nodules present [No Respiratory Distress] : no respiratory distress  [No Accessory Muscle Use] : no accessory muscle use [Clear to Auscultation] : lungs were clear to auscultation bilaterally [Normal Rate] : normal rate  [Regular Rhythm] : with a regular rhythm [Normal S1, S2] : normal S1 and S2 [No Murmur] : no murmur heard [No Carotid Bruits] : no carotid bruits [No Abdominal Bruit] : a ~M bruit was not heard ~T in the abdomen [No Varicosities] : no varicosities [Pedal Pulses Present] : the pedal pulses are present [No Palpable Aorta] : no palpable aorta [No Edema] : there was no peripheral edema [No Extremity Clubbing/Cyanosis] : no extremity clubbing/cyanosis [Soft] : abdomen soft [Non Tender] : non-tender [Non-distended] : non-distended [No Masses] : no abdominal mass palpated [No HSM] : no HSM [Normal Posterior Cervical Nodes] : no posterior cervical lymphadenopathy [Normal Bowel Sounds] : normal bowel sounds [Normal Anterior Cervical Nodes] : no anterior cervical lymphadenopathy [No CVA Tenderness] : no CVA  tenderness [No Spinal Tenderness] : no spinal tenderness [No Joint Swelling] : no joint swelling [Grossly Normal Strength/Tone] : grossly normal strength/tone [No Rash] : no rash [Coordination Grossly Intact] : coordination grossly intact [No Focal Deficits] : no focal deficits [Normal Gait] : normal gait [Deep Tendon Reflexes (DTR)] : deep tendon reflexes were 2+ and symmetric [Normal Affect] : the affect was normal [Normal Insight/Judgement] : insight and judgment were intact [de-identified] : impacted cerumen of both ear

## 2022-10-19 NOTE — REVIEW OF SYSTEMS
[Fatigue] : fatigue [Abdominal Pain] : abdominal pain [Nausea] : nausea [Dizziness] : dizziness [Negative] : Heme/Lymph

## 2022-10-19 NOTE — HISTORY OF PRESENT ILLNESS
[FreeTextEntry8] : 37 year old female presenting with  female presenting with fatigue, dizziness, nausea, intermittent stomach ache. She started on clomiphene for pregnancy 2 months ago. Patient has a history of similar symptoms and wonders if some vitamin  is low. would also like to get pregnancy test.

## 2022-10-19 NOTE — ASSESSMENT
[FreeTextEntry1] : Patient presenting with dizziness and fatigue with a history of vitamin b12 and vitamin d deficiency. \par found to have impacted cerumen - recommended to f/u for irrigation after using debrox.\par will do blood work to rule out anemia, pregnancy, vitamin b12 and d def. \par discussed in detail diet and exercise.

## 2022-11-07 ENCOUNTER — APPOINTMENT (OUTPATIENT)
Dept: INTERNAL MEDICINE | Facility: CLINIC | Age: 37
End: 2022-11-07

## 2022-11-07 LAB
HCG UR QL: NEGATIVE
QUALITY CONTROL: YES

## 2022-11-07 PROCEDURE — 81025 URINE PREGNANCY TEST: CPT

## 2022-11-07 PROCEDURE — 96372 THER/PROPH/DIAG INJ SC/IM: CPT

## 2022-11-07 RX ORDER — CYANOCOBALAMIN 1000 UG/ML
1000 INJECTION INTRAMUSCULAR; SUBCUTANEOUS
Qty: 0 | Refills: 0 | Status: COMPLETED | OUTPATIENT
Start: 2022-11-04

## 2022-11-10 ENCOUNTER — APPOINTMENT (OUTPATIENT)
Dept: INTERNAL MEDICINE | Facility: CLINIC | Age: 37
End: 2022-11-10

## 2022-11-10 PROCEDURE — 36415 COLL VENOUS BLD VENIPUNCTURE: CPT

## 2022-11-12 LAB — HCG SERPL-MCNC: <1 MIU/ML

## 2022-12-19 ENCOUNTER — APPOINTMENT (OUTPATIENT)
Dept: FAMILY MEDICINE | Facility: CLINIC | Age: 37
End: 2022-12-19

## 2022-12-19 VITALS
OXYGEN SATURATION: 99 % | SYSTOLIC BLOOD PRESSURE: 120 MMHG | DIASTOLIC BLOOD PRESSURE: 80 MMHG | HEART RATE: 77 BPM | HEIGHT: 61 IN | BODY MASS INDEX: 39.65 KG/M2 | WEIGHT: 210 LBS

## 2022-12-19 PROCEDURE — 96372 THER/PROPH/DIAG INJ SC/IM: CPT

## 2022-12-19 RX ORDER — CYANOCOBALAMIN 1000 UG/ML
1000 INJECTION INTRAMUSCULAR; SUBCUTANEOUS
Qty: 0 | Refills: 0 | Status: COMPLETED | OUTPATIENT
Start: 2022-12-19

## 2022-12-19 RX ORDER — CLOMIPHENE CITRATE 50 MG/1
50 TABLET ORAL
Qty: 5 | Refills: 0 | Status: DISCONTINUED | COMMUNITY
Start: 2022-07-22 | End: 2022-12-19

## 2022-12-20 ENCOUNTER — APPOINTMENT (OUTPATIENT)
Dept: OBGYN | Facility: CLINIC | Age: 37
End: 2022-12-20

## 2022-12-20 ENCOUNTER — ASOB RESULT (OUTPATIENT)
Age: 37
End: 2022-12-20

## 2022-12-20 VITALS
HEIGHT: 58 IN | BODY MASS INDEX: 44.92 KG/M2 | DIASTOLIC BLOOD PRESSURE: 90 MMHG | WEIGHT: 214 LBS | SYSTOLIC BLOOD PRESSURE: 140 MMHG

## 2022-12-20 DIAGNOSIS — Z32.00 ENCOUNTER FOR PREGNANCY TEST, RESULT UNKNOWN: ICD-10-CM

## 2022-12-20 PROCEDURE — 99213 OFFICE O/P EST LOW 20 MIN: CPT

## 2022-12-20 PROCEDURE — 76817 TRANSVAGINAL US OBSTETRIC: CPT

## 2022-12-20 RX ORDER — HYDROXYZINE HYDROCHLORIDE 25 MG/1
25 TABLET ORAL
Qty: 10 | Refills: 0 | Status: DISCONTINUED | COMMUNITY
Start: 2022-07-29

## 2022-12-20 RX ORDER — BENZONATATE 100 MG/1
100 CAPSULE ORAL
Qty: 30 | Refills: 0 | Status: DISCONTINUED | COMMUNITY
Start: 2022-07-29

## 2022-12-20 NOTE — PLAN
[FreeTextEntry1] : Pt advise that she will need to transfer to high risk practice due to elevated BMI.\par \par Nurse Magnolia has facilitated an appointment for patient with high risk OB practice at [facility] in January.\par \par All questions answered.\par \par -I have spent 20 minutes on this encounter.\par

## 2022-12-20 NOTE — HISTORY OF PRESENT ILLNESS
[FreeTextEntry1] : 38 y/o  with LMP the last week of October presents for confirmation of pregnancy,\par \par Feels well overall. Eating and drinking. Taking a prenatal vitamin.\par \par Pt's height was measured today and she was found to be 4'10". Her weight was 214.  This makes her BMI 44.73.\par \par HEr BP was 140/90 x 2. She has episodic elevations in her VITALS history, however has not been diagnosed with hypertension.\par \par This pregnancy was spontaneous and is desired.

## 2022-12-20 NOTE — PROCEDURE
[Transvaginal OB Sonogram] : Transvaginal OB Sonogram [CRL: ___ (mm)] : CRL - [unfilled]Umm [Current GA by Sonogram: ___ (wks)] : Current GA by Sonogram: [unfilled]Uwks [___ day(s)] : [unfilled] days [FreeTextEntry1] : Single IUP with positive fetal cardiac activity noted. Normal yolk sac and fetal pole noted. Dating is consistent with LMP, EDC 8/11/23. No free fluid in cul de sac. Both ovaries were visualized and appear normal.

## 2023-01-12 ENCOUNTER — NON-APPOINTMENT (OUTPATIENT)
Age: 38
End: 2023-01-12

## 2023-01-13 ENCOUNTER — NON-APPOINTMENT (OUTPATIENT)
Age: 38
End: 2023-01-13

## 2023-01-16 ENCOUNTER — RESULT REVIEW (OUTPATIENT)
Age: 38
End: 2023-01-16

## 2023-01-17 ENCOUNTER — TRANSCRIPTION ENCOUNTER (OUTPATIENT)
Age: 38
End: 2023-01-17

## 2023-01-18 ENCOUNTER — RX RENEWAL (OUTPATIENT)
Age: 38
End: 2023-01-18

## 2023-01-19 ENCOUNTER — TRANSCRIPTION ENCOUNTER (OUTPATIENT)
Age: 38
End: 2023-01-19

## 2023-01-20 ENCOUNTER — NON-APPOINTMENT (OUTPATIENT)
Age: 38
End: 2023-01-20

## 2023-01-23 DIAGNOSIS — K80.00 CALCULUS OF GALLBLADDER WITH ACUTE CHOLECYSTITIS W/OUT OBSTRUCTION: ICD-10-CM

## 2023-01-23 DIAGNOSIS — Z34.90 ENCOUNTER FOR SUPERVISION OF NORMAL PREGNANCY, UNSPECIFIED, UNSPECIFIED TRIMESTER: ICD-10-CM

## 2023-01-25 ENCOUNTER — NON-APPOINTMENT (OUTPATIENT)
Age: 38
End: 2023-01-25

## 2023-01-25 ENCOUNTER — APPOINTMENT (OUTPATIENT)
Dept: SURGERY | Facility: CLINIC | Age: 38
End: 2023-01-25
Payer: MEDICAID

## 2023-01-25 VITALS
WEIGHT: 207 LBS | OXYGEN SATURATION: 99 % | DIASTOLIC BLOOD PRESSURE: 101 MMHG | HEART RATE: 104 BPM | TEMPERATURE: 99.2 F | RESPIRATION RATE: 18 BRPM | BODY MASS INDEX: 43.45 KG/M2 | HEIGHT: 58 IN | SYSTOLIC BLOOD PRESSURE: 150 MMHG

## 2023-01-25 PROCEDURE — 99024 POSTOP FOLLOW-UP VISIT: CPT

## 2023-01-25 NOTE — PLAN
[FreeTextEntry1] : Patient is 8 days status post cholecystectomy done during her first trimester of pregnancy.  She has no complaints.  Her pregnancy was still viable after the procedure and had to be done because she failed a percutaneous aspiration.  She currently has no complaints.\par \par On exam her abdomen is soft and nontender.  Her Tim-Villanueva drain has clear fluid in it it is removed at this time.  Her incisions are well-healed.\par \par Plan: The patient avoid heavy lifting for 2 additional weeks.  She has been advised to see her gynecologist as her blood pressure is elevated.  He is also been advised that she is at increased risk of developing an incisional hernia because of her pregnancy.  Her pathology is reviewed and it shows chronic cholecystitis.

## 2023-03-10 ENCOUNTER — APPOINTMENT (OUTPATIENT)
Dept: FAMILY MEDICINE | Facility: CLINIC | Age: 38
End: 2023-03-10
Payer: MEDICAID

## 2023-03-10 VITALS
SYSTOLIC BLOOD PRESSURE: 120 MMHG | HEIGHT: 58 IN | TEMPERATURE: 98 F | WEIGHT: 214 LBS | BODY MASS INDEX: 44.92 KG/M2 | OXYGEN SATURATION: 98 % | DIASTOLIC BLOOD PRESSURE: 80 MMHG | HEART RATE: 89 BPM

## 2023-03-10 DIAGNOSIS — H66.90 OTITIS MEDIA, UNSPECIFIED, UNSPECIFIED EAR: ICD-10-CM

## 2023-03-10 PROCEDURE — 99213 OFFICE O/P EST LOW 20 MIN: CPT | Mod: 25

## 2023-03-10 PROCEDURE — 96372 THER/PROPH/DIAG INJ SC/IM: CPT

## 2023-03-10 RX ORDER — CYANOCOBALAMIN 1000 UG/ML
1000 INJECTION INTRAMUSCULAR; SUBCUTANEOUS
Qty: 0 | Refills: 0 | Status: COMPLETED | OUTPATIENT
Start: 2023-03-10

## 2023-03-10 RX ADMIN — CYANOCOBALAMIN 0 MCG/ML: 1000 INJECTION, SOLUTION INTRAMUSCULAR at 00:00

## 2023-03-15 PROBLEM — H66.90 OTITIS MEDIA: Status: RESOLVED | Noted: 2023-03-10 | Resolved: 2023-04-09

## 2023-03-15 NOTE — ADDENDUM
[FreeTextEntry1] : Patient presented for Vitamin B12 ( Cyanocobalamin ) 1000 mcg/mL injection as ordered by Dr. Fitzgerald. Patient denied severe allergic reaction to prior vitamin B12 injections.\par \par :  [employer]\par ND:  [id number]\par Lot:  [id number]\par Exp:  05/01/2024\par \par Patient tolerated IM administration well in right deltoid. No immediate adverse reaction noted.\par \par Veronica Quesada RN\par \par

## 2023-03-15 NOTE — HISTORY OF PRESENT ILLNESS
[FreeTextEntry8] : 37 year old EGA 17w3d presenting with right ear pain for 2 days. Denies injury/water in the ear. denies fever or chills.

## 2023-03-15 NOTE — PHYSICAL EXAM
[Normal] : normal rate, regular rhythm, normal S1 and S2 and no murmur heard [de-identified] : right ear - wax present, canal and TM inflamed

## 2023-05-19 ENCOUNTER — APPOINTMENT (OUTPATIENT)
Dept: FAMILY MEDICINE | Facility: CLINIC | Age: 38
End: 2023-05-19
Payer: MEDICAID

## 2023-05-19 ENCOUNTER — MED ADMIN CHARGE (OUTPATIENT)
Age: 38
End: 2023-05-19

## 2023-05-19 VITALS
BODY MASS INDEX: 43.24 KG/M2 | HEIGHT: 58 IN | OXYGEN SATURATION: 95 % | HEART RATE: 105 BPM | SYSTOLIC BLOOD PRESSURE: 140 MMHG | WEIGHT: 206 LBS | TEMPERATURE: 98 F | DIASTOLIC BLOOD PRESSURE: 90 MMHG

## 2023-05-19 DIAGNOSIS — O99.891 OTHER SPECIFIED DISEASES AND CONDITIONS COMPLICATING PREGNANCY: ICD-10-CM

## 2023-05-19 DIAGNOSIS — M54.9 OTHER SPECIFIED DISEASES AND CONDITIONS COMPLICATING PREGNANCY: ICD-10-CM

## 2023-05-19 PROCEDURE — 96372 THER/PROPH/DIAG INJ SC/IM: CPT

## 2023-05-19 PROCEDURE — 99213 OFFICE O/P EST LOW 20 MIN: CPT | Mod: 25

## 2023-05-22 PROBLEM — O99.891 BACK PAIN IN PREGNANCY: Status: ACTIVE | Noted: 2023-05-22

## 2023-05-22 RX ORDER — CYANOCOBALAMIN 1000 UG/ML
1000 INJECTION INTRAMUSCULAR; SUBCUTANEOUS
Qty: 0 | Refills: 0 | Status: COMPLETED | OUTPATIENT
Start: 2022-04-06

## 2023-05-22 NOTE — HISTORY OF PRESENT ILLNESS
[FreeTextEntry8] : 37 year old female 7 month pregnant presenting after a fall and ED visit. Patient reports on 04/21/23 she had a fall and hurt her back at [employer], went to ED and had ultrasound - fetus was normal. Patient was discharged home with pain medication. She continues to have lower back pain and has been taking tylenol which helps some. She is otherwise well. would also like to get the B12 injection today.

## 2023-05-26 NOTE — COUNSELING
[Weight management counseling provided] : Weight management [Healthy eating counseling provided] : healthy eating [Activity counseling provided] : activity Price (Do Not Change): 0.00 Instructions: This plan will send the code FBSE to the PM system.  DO NOT or CHANGE the price. Detail Level: Simple

## 2023-06-14 ENCOUNTER — TRANSCRIPTION ENCOUNTER (OUTPATIENT)
Age: 38
End: 2023-06-14

## 2023-06-18 ENCOUNTER — TRANSCRIPTION ENCOUNTER (OUTPATIENT)
Age: 38
End: 2023-06-18

## 2023-06-20 ENCOUNTER — NON-APPOINTMENT (OUTPATIENT)
Age: 38
End: 2023-06-20

## 2023-06-20 RX ORDER — AMOXICILLIN 875 MG/1
875 TABLET, FILM COATED ORAL
Qty: 14 | Refills: 0 | Status: DISCONTINUED | COMMUNITY
Start: 2023-03-10 | End: 2023-06-20

## 2023-08-22 ENCOUNTER — APPOINTMENT (OUTPATIENT)
Dept: INTERNAL MEDICINE | Facility: CLINIC | Age: 38
End: 2023-08-22
Payer: MEDICAID

## 2023-08-22 PROCEDURE — 96372 THER/PROPH/DIAG INJ SC/IM: CPT

## 2023-08-22 RX ORDER — CYANOCOBALAMIN 1000 UG/ML
1000 INJECTION INTRAMUSCULAR; SUBCUTANEOUS
Qty: 0 | Refills: 0 | Status: COMPLETED | OUTPATIENT
Start: 2023-08-22

## 2023-09-14 ENCOUNTER — APPOINTMENT (OUTPATIENT)
Dept: FAMILY MEDICINE | Facility: CLINIC | Age: 38
End: 2023-09-14
Payer: MEDICAID

## 2023-09-14 VITALS
SYSTOLIC BLOOD PRESSURE: 130 MMHG | OXYGEN SATURATION: 98 % | WEIGHT: 200 LBS | HEART RATE: 77 BPM | BODY MASS INDEX: 41.98 KG/M2 | HEIGHT: 58 IN | DIASTOLIC BLOOD PRESSURE: 80 MMHG

## 2023-09-14 DIAGNOSIS — K59.01 SLOW TRANSIT CONSTIPATION: ICD-10-CM

## 2023-09-14 DIAGNOSIS — Z00.00 ENCOUNTER FOR GENERAL ADULT MEDICAL EXAMINATION W/OUT ABNORMAL FINDINGS: ICD-10-CM

## 2023-09-14 PROCEDURE — G0444 DEPRESSION SCREEN ANNUAL: CPT | Mod: 59

## 2023-09-14 PROCEDURE — 99396 PREV VISIT EST AGE 40-64: CPT | Mod: 25

## 2023-09-14 RX ORDER — ERGOCALCIFEROL 1.25 MG/1
1.25 MG CAPSULE, LIQUID FILLED ORAL
Qty: 12 | Refills: 0 | Status: DISCONTINUED | COMMUNITY
Start: 2022-10-18 | End: 2023-09-14

## 2023-09-14 RX ORDER — VITAMIN A ACETATE, .BETA.-CAROTENE, ASCORBIC ACID, CHOLECALCIFEROL, .ALPHA.-TOCOPHEROL ACETATE, DL-, THIAMINE MONONITRATE, RIBOFLAVIN, NIACINAMIDE, PYRIDOXINE HYDROCHLORIDE, FOLIC ACID, CYANOCOBALAMIN, CALCIUM CARBONATE, FERROUS FUMARATE, ZINC OXIDE, CUPRIC OXIDE 3080; 920; 120; 400; 22; 1.84; 3; 20; 10; 1; 12; 200; 27; 25; 2 [IU]/1; [IU]/1; MG/1; [IU]/1; MG/1; MG/1; MG/1; MG/1; MG/1; MG/1; UG/1; MG/1; MG/1; MG/1; MG/1
27-1 TABLET, FILM COATED ORAL DAILY
Qty: 90 | Refills: 3 | Status: DISCONTINUED | COMMUNITY
Start: 2022-10-18 | End: 2023-09-14

## 2023-09-15 PROBLEM — K59.01 SLOW TRANSIT CONSTIPATION: Status: ACTIVE | Noted: 2021-07-12

## 2023-09-20 LAB
CHOLEST SERPL-MCNC: 254 MG/DL
HDLC SERPL-MCNC: 54 MG/DL
LDLC SERPL CALC-MCNC: 155 MG/DL
NONHDLC SERPL-MCNC: 200 MG/DL
TRIGL SERPL-MCNC: 247 MG/DL

## 2023-09-22 ENCOUNTER — NON-APPOINTMENT (OUTPATIENT)
Age: 38
End: 2023-09-22

## 2023-10-13 ENCOUNTER — APPOINTMENT (OUTPATIENT)
Dept: FAMILY MEDICINE | Facility: CLINIC | Age: 38
End: 2023-10-13
Payer: MEDICAID

## 2023-10-13 VITALS
WEIGHT: 200 LBS | DIASTOLIC BLOOD PRESSURE: 80 MMHG | SYSTOLIC BLOOD PRESSURE: 120 MMHG | BODY MASS INDEX: 41.98 KG/M2 | OXYGEN SATURATION: 98 % | HEART RATE: 90 BPM | HEIGHT: 58 IN

## 2023-10-13 DIAGNOSIS — Z01.818 ENCOUNTER FOR OTHER PREPROCEDURAL EXAMINATION: ICD-10-CM

## 2023-10-13 PROCEDURE — 81025 URINE PREGNANCY TEST: CPT

## 2023-10-13 PROCEDURE — 99214 OFFICE O/P EST MOD 30 MIN: CPT | Mod: 25

## 2023-10-13 PROCEDURE — 36415 COLL VENOUS BLD VENIPUNCTURE: CPT

## 2023-10-13 RX ADMIN — CYANOCOBALAMIN 0 MCG/ML: 1000 INJECTION INTRAMUSCULAR; SUBCUTANEOUS at 00:00

## 2023-10-15 PROBLEM — Z01.818 PREOP EXAMINATION: Status: ACTIVE | Noted: 2019-04-04

## 2023-10-18 LAB
AMPHET UR-MCNC: NEGATIVE NG/ML
BARBITURATES UR-MCNC: NEGATIVE NG/ML
BENZODIAZ UR-MCNC: NEGATIVE NG/ML
COCAINE METAB.OTHER UR-MCNC: NEGATIVE NG/ML
CREATININE, URINE: 166.6 MG/DL
FENTANYL, URINE: NEGATIVE NG/ML
M TB IFN-G BLD-IMP: NEGATIVE
METHADONE UR-MCNC: NEGATIVE NG/ML
OPIATES UR-MCNC: NEGATIVE NG/ML
OXYCODONE/OXYMORPHONE, URINE: NEGATIVE NG/ML
PCP UR-MCNC: NEGATIVE NG/ML
PH, URINE: 5.1
PLEASE NOTE: DRUGSCRUR: NORMAL
QUANTIFERON TB PLUS MITOGEN MINUS NIL: 2.18 IU/ML
QUANTIFERON TB PLUS NIL: 0.02 IU/ML
QUANTIFERON TB PLUS TB1 MINUS NIL: 0 IU/ML
QUANTIFERON TB PLUS TB2 MINUS NIL: 0 IU/ML
THC UR QL: NEGATIVE NG/ML

## 2023-12-13 ENCOUNTER — APPOINTMENT (OUTPATIENT)
Dept: FAMILY MEDICINE | Facility: CLINIC | Age: 38
End: 2023-12-13
Payer: MEDICAID

## 2023-12-13 VITALS
SYSTOLIC BLOOD PRESSURE: 140 MMHG | DIASTOLIC BLOOD PRESSURE: 90 MMHG | HEART RATE: 90 BPM | HEIGHT: 58 IN | WEIGHT: 212 LBS | OXYGEN SATURATION: 98 % | BODY MASS INDEX: 44.5 KG/M2

## 2023-12-13 DIAGNOSIS — G56.00 CARPAL TUNNEL SYNDROME, UNSPECIFIED UPPER LIMB: ICD-10-CM

## 2023-12-13 DIAGNOSIS — M54.9 DORSALGIA, UNSPECIFIED: ICD-10-CM

## 2023-12-13 DIAGNOSIS — G89.29 DORSALGIA, UNSPECIFIED: ICD-10-CM

## 2023-12-13 PROCEDURE — 99214 OFFICE O/P EST MOD 30 MIN: CPT

## 2023-12-13 RX ADMIN — CYANOCOBALAMIN 0 MCG/ML: 1000 INJECTION INTRAMUSCULAR; SUBCUTANEOUS at 00:00

## 2023-12-17 PROBLEM — M54.9 CHRONIC BACK PAIN: Status: ACTIVE | Noted: 2023-09-14

## 2023-12-17 PROBLEM — G56.00 CARPAL TUNNEL SYNDROME: Status: ACTIVE | Noted: 2023-12-17

## 2023-12-17 NOTE — ADDENDUM
[FreeTextEntry1] :  Patient presented for Vitamin B12 (Cyanocobalamin) 1000 mcg/mL injection as ordered by  [ name ]. Patient denied severe allergic reaction to prior vitamin B12 injections.  Manufactureer:  [employer] NDC:  6788-7391-66 Lot:  [id number] Exp:03/2025  Patient tolerated IM administration well in left deltoid. No immediate adverse reaction noted.  Delaney Chaves LPN

## 2023-12-17 NOTE — HISTORY OF PRESENT ILLNESS
[FreeTextEntry8] : 38 year old female presensting with complaints of chronic lower back pain since her accident on . 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 There is mild faced arthrosis at L4-5. MRI was ordered but still pending. Patient also complaints of chronic constipation that has worsened recently. She is not on any pain meds. is 6 months postpartum/ . Reports has been unable to move any bowels for 3 days now. Has tried miralax, senna, colace, milk of magnesium, lactulose, drinking water and no changes.

## 2023-12-17 NOTE — ADDENDUM
[FreeTextEntry1] :  Patient presented for Vitamin B12 (Cyanocobalamin) 1000 mcg/mL injection as ordered by  [ name ]. Patient denied severe allergic reaction to prior vitamin B12 injections.  Manufactureer:  [employer] [place] NDC:  6172-8663-66 Lot:  [id number] Exp:03/2025  Patient tolerated IM administration well in left deltoid. No immediate adverse reaction noted.  Delaney Chaves LPN

## 2023-12-31 PROBLEM — Z91.89 AT RISK FOR SEXUALLY TRANSMITTED DISEASE DUE TO UNPROTECTED SEX: Status: RESOLVED | Noted: 2020-05-28 | Resolved: 2022-07-22

## 2024-01-24 ENCOUNTER — APPOINTMENT (OUTPATIENT)
Dept: FAMILY MEDICINE | Facility: CLINIC | Age: 39
End: 2024-01-24
Payer: MEDICAID

## 2024-01-24 ENCOUNTER — APPOINTMENT (OUTPATIENT)
Dept: FAMILY MEDICINE | Facility: CLINIC | Age: 39
End: 2024-01-24

## 2024-01-24 ENCOUNTER — NON-APPOINTMENT (OUTPATIENT)
Age: 39
End: 2024-01-24

## 2024-01-24 VITALS
HEIGHT: 58 IN | WEIGHT: 214 LBS | OXYGEN SATURATION: 99 % | SYSTOLIC BLOOD PRESSURE: 128 MMHG | DIASTOLIC BLOOD PRESSURE: 85 MMHG | HEART RATE: 82 BPM | BODY MASS INDEX: 44.92 KG/M2

## 2024-01-24 DIAGNOSIS — E05.90 THYROTOXICOSIS, UNSPECIFIED W/OUT THYROTOXIC CRISIS OR STORM: ICD-10-CM

## 2024-01-24 DIAGNOSIS — K59.09 OTHER CONSTIPATION: ICD-10-CM

## 2024-01-24 DIAGNOSIS — E53.8 DEFICIENCY OF OTHER SPECIFIED B GROUP VITAMINS: ICD-10-CM

## 2024-01-24 PROBLEM — Z00.00 ENCOUNTER FOR PREVENTIVE HEALTH EXAMINATION: Status: ACTIVE | Noted: 2024-01-24

## 2024-01-24 PROCEDURE — 36415 COLL VENOUS BLD VENIPUNCTURE: CPT

## 2024-01-24 PROCEDURE — 99214 OFFICE O/P EST MOD 30 MIN: CPT

## 2024-01-24 RX ORDER — LIDOCAINE 5% 700 MG/1
5 PATCH TOPICAL
Qty: 10 | Refills: 0 | Status: DISCONTINUED | COMMUNITY
Start: 2023-09-14 | End: 2024-01-24

## 2024-01-24 RX ORDER — LACTULOSE 10 G/10G
10 SOLUTION ORAL DAILY
Qty: 30 | Refills: 0 | Status: DISCONTINUED | COMMUNITY
Start: 2023-09-15 | End: 2024-01-24

## 2024-01-24 NOTE — ADDENDUM
[FreeTextEntry1] : Patient presented for Vitamin B12 (Cyanocobalamin) 1000 mcg/mL injection as ordered by Dr. Fitzgerald. Patient denied severe allergic reaction to prior vitamin B12 injections.  Manufactureer:  [employer] [place] NDC:  1787-4952-50 Lot: [id number]  Exp:03/2025  Patient tolerated IM administration well in right deltoid. No immediate adverse reaction noted.  Patient instructed to make a one month follow up visit.  Delaney Chaves LPN

## 2024-01-24 NOTE — HEALTH RISK ASSESSMENT
[FreeTextEntry1] : Not losing weight [Fair] :  ~his/her~ mood as fair [No] : No [Never (0 pts)] : Never (0 points) [0] : 2) Feeling down, depressed, or hopeless: Not at all (0) [Audit-CScore] : 0 [de-identified] : work out at home, walking [[id number]] : 0 [Patient reported PAP Smear was normal] : Patient reported PAP Smear was normal [HIV test declined] : HIV test declined [None] : None [Hepatitis C test declined] : Hepatitis C test declined [With Family] : lives with family [Unemployed] : unemployed [# Of Children ___] : has [unfilled] children [Fully functional (bathing, dressing, toileting, transferring, walking, feeding)] : Fully functional (bathing, dressing, toileting, transferring, walking, feeding) [Fully functional (using the telephone, shopping, preparing meals, housekeeping, doing laundry, using] : Fully functional and needs no help or supervision to perform IADLs (using the telephone, shopping, preparing meals, housekeeping, doing laundry, using transportation, managing medications and managing finances) [Reports changes in hearing] : Reports no changes in hearing [Reports changes in vision] : Reports no changes in vision [Reports changes in dental health] : Reports no changes in dental health [Smoke Detector] : smoke detector [Carbon Monoxide Detector] : carbon monoxide detector [Safety elements used in home] : no safety elements used in home [Seat Belt] :  uses seat belt [Sunscreen] : uses sunscreen [PapSmearDate] : 1/2022 [Never] : Never

## 2024-01-24 NOTE — HEALTH RISK ASSESSMENT
[Fair] :  ~his/her~ mood as fair [No] : No [Never (0 pts)] : Never (0 points) [0] : 2) Feeling down, depressed, or hopeless: Not at all (0) [HIV test declined] : HIV test declined [Hepatitis C test declined] : Hepatitis C test declined [None] : None [With Family] : lives with family [Unemployed] : unemployed [# Of Children ___] : has [unfilled] children [Fully functional (bathing, dressing, toileting, transferring, walking, feeding)] : Fully functional (bathing, dressing, toileting, transferring, walking, feeding) [Fully functional (using the telephone, shopping, preparing meals, housekeeping, doing laundry, using] : Fully functional and needs no help or supervision to perform IADLs (using the telephone, shopping, preparing meals, housekeeping, doing laundry, using transportation, managing medications and managing finances) [Smoke Detector] : smoke detector [Carbon Monoxide Detector] : carbon monoxide detector [Seat Belt] :  uses seat belt [Sunscreen] : uses sunscreen [Never] : Never [FreeTextEntry1] : Not losing weight, constipation [Audit-CScore] : 0 [de-identified] : works out at home [[id number]] : 0 [Reports changes in hearing] : Reports no changes in hearing [Reports changes in vision] : Reports no changes in vision [Reports changes in dental health] : Reports no changes in dental health [Safety elements used in home] : no safety elements used in home [PapSmearDate] : 1/2022

## 2024-01-24 NOTE — HEALTH RISK ASSESSMENT
[FreeTextEntry1] : Not losing weight [Fair] :  ~his/her~ mood as fair [No] : No [Never (0 pts)] : Never (0 points) [0] : 2) Feeling down, depressed, or hopeless: Not at all (0) [Audit-CScore] : 0 [de-identified] : work out at home, walking [[id number]] : 0 [Patient reported PAP Smear was normal] : Patient reported PAP Smear was normal [HIV test declined] : HIV test declined [None] : None [Hepatitis C test declined] : Hepatitis C test declined [With Family] : lives with family [Unemployed] : unemployed [# Of Children ___] : has [unfilled] children [Fully functional (bathing, dressing, toileting, transferring, walking, feeding)] : Fully functional (bathing, dressing, toileting, transferring, walking, feeding) [Fully functional (using the telephone, shopping, preparing meals, housekeeping, doing laundry, using] : Fully functional and needs no help or supervision to perform IADLs (using the telephone, shopping, preparing meals, housekeeping, doing laundry, using transportation, managing medications and managing finances) [Reports changes in hearing] : Reports no changes in hearing [Reports changes in vision] : Reports no changes in vision [Reports changes in dental health] : Reports no changes in dental health [Smoke Detector] : smoke detector [Carbon Monoxide Detector] : carbon monoxide detector [Safety elements used in home] : no safety elements used in home [Seat Belt] :  uses seat belt [Sunscreen] : uses sunscreen [PapSmearDate] : 1/2022 [Never] : Never

## 2024-01-26 LAB
25(OH)D3 SERPL-MCNC: 18.1 NG/ML
ALBUMIN SERPL ELPH-MCNC: 3.8 G/DL
ALP BLD-CCNC: 81 U/L
ALT SERPL-CCNC: 20 U/L
ANION GAP SERPL CALC-SCNC: 14 MMOL/L
AST SERPL-CCNC: 12 U/L
BASOPHILS # BLD AUTO: 0.04 K/UL
BASOPHILS NFR BLD AUTO: 0.3 %
BILIRUB SERPL-MCNC: 0.5 MG/DL
BUN SERPL-MCNC: 12 MG/DL
CALCIUM SERPL-MCNC: 8.6 MG/DL
CHLORIDE SERPL-SCNC: 105 MMOL/L
CHOLEST SERPL-MCNC: 214 MG/DL
CO2 SERPL-SCNC: 22 MMOL/L
CREAT SERPL-MCNC: 0.76 MG/DL
EGFR: 102 ML/MIN/1.73M2
EOSINOPHIL # BLD AUTO: 0.25 K/UL
EOSINOPHIL NFR BLD AUTO: 2.2 %
ESTIMATED AVERAGE GLUCOSE: 105 MG/DL
FOLATE SERPL-MCNC: 12.3 NG/ML
GLUCOSE SERPL-MCNC: 87 MG/DL
HBA1C MFR BLD HPLC: 5.3 %
HCT VFR BLD CALC: 41.8 %
HDLC SERPL-MCNC: 44 MG/DL
HGB BLD-MCNC: 12.9 G/DL
IMM GRANULOCYTES NFR BLD AUTO: 0.5 %
LDLC SERPL CALC-MCNC: 125 MG/DL
LYMPHOCYTES # BLD AUTO: 3.13 K/UL
LYMPHOCYTES NFR BLD AUTO: 27.1 %
MAN DIFF?: NORMAL
MCHC RBC-ENTMCNC: 27.2 PG
MCHC RBC-ENTMCNC: 30.9 GM/DL
MCV RBC AUTO: 88.2 FL
MONOCYTES # BLD AUTO: 0.59 K/UL
MONOCYTES NFR BLD AUTO: 5.1 %
NEUTROPHILS # BLD AUTO: 7.49 K/UL
NEUTROPHILS NFR BLD AUTO: 64.8 %
NONHDLC SERPL-MCNC: 170 MG/DL
PLATELET # BLD AUTO: 355 K/UL
POTASSIUM SERPL-SCNC: 4.2 MMOL/L
PROT SERPL-MCNC: 6.6 G/DL
RBC # BLD: 4.74 M/UL
RBC # FLD: 15.6 %
SODIUM SERPL-SCNC: 141 MMOL/L
TRIGL SERPL-MCNC: 253 MG/DL
TSH SERPL-ACNC: 3.13 UIU/ML
VIT B12 SERPL-MCNC: 418 PG/ML
WBC # FLD AUTO: 11.56 K/UL

## 2024-01-26 RX ORDER — ERGOCALCIFEROL 1.25 MG/1
1.25 MG CAPSULE, LIQUID FILLED ORAL
Qty: 12 | Refills: 0 | Status: ACTIVE | COMMUNITY
Start: 2024-01-26 | End: 1900-01-01

## 2024-01-30 PROBLEM — E05.90 HYPERTHYROIDISM: Status: ACTIVE | Noted: 2019-01-18

## 2024-01-30 PROBLEM — K59.09 CHRONIC CONSTIPATION: Status: ACTIVE | Noted: 2023-12-17

## 2024-01-30 PROBLEM — E53.8 VITAMIN B12 DEFICIENCY: Status: ACTIVE | Noted: 2018-09-29

## 2024-01-30 NOTE — HISTORY OF PRESENT ILLNESS
[FreeTextEntry1] : Follow up  [de-identified] : 38 year old female presenting with worsening constipation. Reports she has tried everything and nothing is helping. She had physical done on 09/2023 but didn't have any blood work done and would like blood done today.

## 2024-01-30 NOTE — ASSESSMENT
[FreeTextEntry1] : 38 year old female presented with complaints of chronic constipation and wants blood done will get cmp, lipids, a1c, b12, vitamin D, tsh today, collected in the office.  constipation - referred patient to GI Weight gain - wants weight loss medication. Recommend she follow up in 1-2 week to discuss lab results and weight loss medication. patient had multiple questions which were answered.

## 2024-02-07 ENCOUNTER — APPOINTMENT (OUTPATIENT)
Dept: FAMILY MEDICINE | Facility: CLINIC | Age: 39
End: 2024-02-07

## 2024-02-07 ENCOUNTER — APPOINTMENT (OUTPATIENT)
Dept: FAMILY MEDICINE | Facility: CLINIC | Age: 39
End: 2024-02-07
Payer: MEDICAID

## 2024-02-07 ENCOUNTER — APPOINTMENT (OUTPATIENT)
Dept: GASTROENTEROLOGY | Facility: CLINIC | Age: 39
End: 2024-02-07

## 2024-02-07 VITALS
HEIGHT: 58 IN | HEART RATE: 75 BPM | OXYGEN SATURATION: 98 % | SYSTOLIC BLOOD PRESSURE: 128 MMHG | DIASTOLIC BLOOD PRESSURE: 88 MMHG

## 2024-02-07 VITALS — WEIGHT: 214 LBS | BODY MASS INDEX: 44.73 KG/M2

## 2024-02-07 DIAGNOSIS — D72.829 ELEVATED WHITE BLOOD CELL COUNT, UNSPECIFIED: ICD-10-CM

## 2024-02-07 DIAGNOSIS — E78.2 MIXED HYPERLIPIDEMIA: ICD-10-CM

## 2024-02-07 PROCEDURE — 99214 OFFICE O/P EST MOD 30 MIN: CPT

## 2024-02-07 RX ORDER — SENNOSIDES 8.6 MG TABLETS 8.6 MG/1
8.6 TABLET ORAL DAILY
Qty: 60 | Refills: 0 | Status: DISCONTINUED | COMMUNITY
Start: 2023-09-15 | End: 2024-02-07

## 2024-02-07 RX ORDER — ASPIRIN 81 MG
6.5 TABLET, DELAYED RELEASE (ENTERIC COATED) ORAL
Qty: 1 | Refills: 0 | Status: COMPLETED | COMMUNITY
Start: 2022-10-17 | End: 2023-02-07

## 2024-02-07 RX ORDER — LINACLOTIDE 72 UG/1
72 CAPSULE, GELATIN COATED ORAL DAILY
Qty: 30 | Refills: 0 | Status: DISCONTINUED | COMMUNITY
Start: 2023-12-17 | End: 2024-02-07

## 2024-02-07 NOTE — ASSESSMENT
[FreeTextEntry1] : 38 year old female presenting for lab results. reviewed labs.  low vitamin d - vitamin d prescribed 50k weekly. elevated cholesterol - discussed diet and exercise. obesity: discussed lifestyle changes including diet and exercise. Referred for nutritionist and list of names given to patient. Patient is not eligible for Wegovy given history of thyroid cancer. discussed other options for weight loss. Patient will speak to her endocrinologist regarding weight loss medication.  Leukocytosis - long history of leukocytosis, reports never had work up, referred to hematologist for further evaluation.

## 2024-02-07 NOTE — HISTORY OF PRESENT ILLNESS
[FreeTextEntry1] : Discuss lab results [de-identified] : 38 year old female with pmh of papillary carcinoma of thyroid s/p partial thyroidectomy, B12 deficiency (on B12 monthly inj), vitamin D deficiency presenting for lab results and to discuss weight loss. Patient reports she has been trying to lose weight with diet alone and hasn't been able to. She would like to try wegovy.

## 2024-02-23 ENCOUNTER — APPOINTMENT (OUTPATIENT)
Dept: ENDOCRINOLOGY | Facility: CLINIC | Age: 39
End: 2024-02-23
Payer: MEDICAID

## 2024-02-23 VITALS
DIASTOLIC BLOOD PRESSURE: 72 MMHG | WEIGHT: 215.56 LBS | HEART RATE: 97 BPM | SYSTOLIC BLOOD PRESSURE: 116 MMHG | HEIGHT: 58 IN | OXYGEN SATURATION: 98 % | BODY MASS INDEX: 45.25 KG/M2

## 2024-02-23 DIAGNOSIS — C73 MALIGNANT NEOPLASM OF THYROID GLAND: ICD-10-CM

## 2024-02-23 DIAGNOSIS — E04.1 NONTOXIC SINGLE THYROID NODULE: ICD-10-CM

## 2024-02-23 DIAGNOSIS — E16.2 HYPOGLYCEMIA, UNSPECIFIED: ICD-10-CM

## 2024-02-23 DIAGNOSIS — E66.01 MORBID (SEVERE) OBESITY DUE TO EXCESS CALORIES: ICD-10-CM

## 2024-02-23 PROCEDURE — 99215 OFFICE O/P EST HI 40 MIN: CPT

## 2024-02-23 RX ORDER — SEMAGLUTIDE 0.25 MG/.5ML
0.25 INJECTION, SOLUTION SUBCUTANEOUS
Qty: 1 | Refills: 6 | Status: ACTIVE | COMMUNITY
Start: 2024-02-23 | End: 1900-01-01

## 2024-02-25 PROBLEM — E04.1 THYROID NODULE: Status: ACTIVE | Noted: 2018-09-29

## 2024-02-25 PROBLEM — C73 THYROID MALIGNANT NEOPLASM: Status: ACTIVE | Noted: 2019-03-10

## 2024-02-25 NOTE — HISTORY OF PRESENT ILLNESS
[FreeTextEntry1] : Feb 23, 2024      in person  PCP:  Dr. Jamari Fitzgerald  CC:  9/7/17:  TSH 0.09 ([facility]); thyrotropin receptor ab 3.29 (0.0 - 1.75);   (0-139)          10/15/17:  24 hr RAIU 13 %, uptake confined almost entirely to R lobe, site of the 9.7 mm hypoechoic nodule         2/15/19:  FNAB R lobe nodule:  [place] VI: papillary thyroid cancer         4/11/19:  R lobectomy, Dr. Perez, papillary thyroid carcinoma, follicular variant, infiltrative** (not NIFTP).          3/3/21:  US Thyroid - [facility] - post R lobectomy        Tg 15 (stable)           1/24/24:  TSH 3.13               [1/2024 Vit D 18-> Rx]    39 yo on B12, vitamin D, but has not required any thyroid hormone post R lobectomy by Dr. Perez - 10 mm papillary thyroid cancer, infiltrative, found.  Last US neck 3/3/21  : : Constitutional:  Alert, well nourished, healthy appearance, no acute distress  Eyes:  No proptosis, no stare Thyroid: Pulmonary:  No respiratory distress, no accessory muscle use; normal rate and effort Cardiac:  Normal rate Vascular:  Endocrine:  No stigmata of Cushings Syndrome Musculoskeletal:  Normal gait, no involuntary movements Neurology:  No tremors Affect/Mood/Psych:  Oriented x 3; normal affect, normal insight/judgement, normal mood  .  Imp/Plan:  US thyroid and ROV by October.              Repeat TSI, TSH receptor ab (elevated 2017)       Jan 12, 2022       iPhone  PCP:  Dr. Jamari Fitzgerald/[facility]  CC:  9/7/17:  TSH 0.09 ([facility]); thyrotropin receptor ab 3.29 (0.0 - 1.75);   (0-139)          10/15/17:  24 hr RAIU 13 %, uptake confined almost entirely to R lobe, site of the 9.7 mm hypoechoic nodule         2/15/19:  FNAB R lobe nodule:  [place] VI: papillary thyroid cancer         4/11/19:  R lobectomy, Dr. Perez, papillary thyroid carcinoma, follicular variant, infiltrative** (not NIFTP).  35 yo  mother of two 23 yo and 8 month son (weighed 4 lbs, premi, at [facility]) on B12, vitamin D, but has not required any thyroid hormone post R lobectomy by Dr. Perez - 10 mm papillary thyroid cancer, infiltrative, found at surgery 4/11/2019.  Most recent US thyroid 3/3/2021:  "IMPRESSION: No interval change from 1/30/2020 with a prior right thyroidectomy and a nodular residual seen in the right thyroid fossa abscess residual tissue. This is unchanged. In the correct clinical setting a parathyroid adenoma may be considered?   Recommend ongoing follow-up  ***Electronically Signed *** ----------------------------------------------- Osmani Arreguin MD              03/03/21"  Impresion:  Euthyroid post  hemithyroidectomy.  Small papillary thyroid cancer remains under surveillance.    Plan:   TFTs at [facility], US thyroid and ROV in June 2022 Jun 29, 2020    in person  PCP:  Dr. Cecil Scruggs/Juanita Guaman  CC:  9/7/17:  TSH 0.09 ([facility]); thyrotropin receptor ab 3.29 (0.0 - 1.75);   (0-139)          10/15/17:  24 hr RAIU 13 %, uptake confined almost entirely to R lobe, site of the 9.7 mm hypoechoic nodule         2/15/19:  FNAB R lobe nodule:  [place] VI: papillary thyroid cancer         4/11/19:  R lobectomy, Dr. Perez, papillary thyroid carcinoma, follicular variant, infiltrative** (not NIFTP).  Now euthyroid. On no thyroid hormone Rx s/p R lobectomy for 1 cm follicular variant of papillary thyroid cancer, infiltrative.** (not NIFTP**)  Impression:  Has not required thyroid hormone; however, will likely start eventually. Being monitored by US, labs, exam  Plan: Labs today.  In future, repeat TSI, TBII.  Updated US neck early December (with labs again then) and ROV within a week of   **NIFTP: Noninvasive follicular thyroid neoplasm with papillary-like nuclear features is an encapsulated or clearly delimited, noninvasive neoplasm with a follicular growth pattern and nuclear features of papillary thyroid carcinoma . It is considered a 'pre-malignant' lesion of the BIJAN-like group.    Jan 31, 2020  PCP:  Dr. Cecil Scruggs/Juanita Guaamn  CC:  R thyroid nodule  (?hot on scan)->  4/11/19 Dr. Perez:  10 mm papillary thyroid carcinoma, follicular variant, infiltrative.         Previously low TSH         Elevated BMI          ?panic attacks -> [facility]  33 yo with previously low TSH, resolved after R lobectomy 4/2019 by Dr. Perez at which time 10 mm infiltrative papillary thyroid cancer removed.  9/12/2019 Tg 13, same as May 2019 TSH 3.12 on no Rx.    US:  reassuring; prominent cervical lymph node.  Plan:  Updated labs today. Start LT4 25 mcg daily if TSH elevated.   Updated labs before July visit. Check vit D  Continued suveillance of thyroid bed, cervical lymph node, TFTs, Tg level.           September 11, 2019 PCP:  Dr. Cecil Scruggs/Juanita Guaman  CC:  R thyroid nodule  (?hot on scan)->  4/11/19 Dr. Perez:  10 mm papillary thyroid carcinoma, follicular variant, infiltrative.         Previously low TSH         Elevated BMI   FINAL DIAGNOSIS   Thyroid, right lobe and isthmus, lobectomy: 	PAPILLARY THYROID CARCINOMA, FOLLICULAR VARIANT,  INFILTRATIVE. 	Tumor size: 10 mm 	All margins are uninvolved by tumor; closest margin is anterior (0.5 mm), at site  	   of prior biopsy. 	One unremarkable parathyroid gland. 	No lymph nodes are identified. 	Pathologic Stage: pT1a NX. See Synoptic Report.  Synoptic Report (Cancer Protocol of  February, 2019, of the College of  	American Pathologists): Procedure: Right lobectomy 	Tumor focality: Unifocal 	Tumor site: Right lobe 	Tumor size, greatest dimension: 10 mm  	Histologic type: Papillary carcinoma, follicular variant, infiltrative. 	Margins: Uninvolved by carcinoma. 		   Distance from closest margin: 0.5  mm from the inked and cauterized  		       anterior margin, at the site of prior biopsy, which there are reactive  	 	       changes. 	Angioinvasion: Not identified 	Lymphatic invasion: Not identified. 	Perineural invasion: Not identified. 	Mitotic rate:   Less than 1 per 2 square millimeters 	Extrathyroidal extension: Not identified. 	Regional lymph nodes: No lymph nodes submitted or found. 	Pathologic Stage Classification: pT1a NX   Lobectomy on Labs at [facility] on 5/17 iincluded Tg 13 TSH 3.39  Plan:  Labs today. US and ROV January     May 17, 2019 PCP:  Dr. Cecil Scruggs/Juanita Guaman  CC:  R thyroid nodule  (?hot on scan)         Previously low TSH         Elevated BMI  Underwent surgical removal of isthmus and R thyroid lobe for 10 mm papillary thyroid carcinoma, follicular variant, infiltrative. Feels well. Wants to lose weight. Has f/u appt to see Dr. Perez end of May.  Plan:  TFTs, Tg today. ROV in August.   February 21, 2019  PCP:  Dr. Cecil Scruggs/Juanita Guaman  CC:  R thyroid nodule  (?hot on scan)         Low TSH         Elevated BMI  In course of evaluation for elevated BMI, TFTs obtained and TSH was low, TSI slightly elevated, ultrasound thyroid showed ~1 cm R thyroid nodule, thyroid scan interpreted as likely "hot" nodule with some suppression of rest of gland.     TSH went back to normal. Follow up ultrasound advised FNAB which is read as positive for papillary thyroid cancer. She will meet with Dr. Perez for his advice in the near future.                         January 18, 2019  PCP:  Dr. Cecil Scruggs  1.  Hyperthyroidism      Lab tests at [facility] on   10/11/2018 included    TSH 0.58    T3 106   (had been 0.09  on 9/7/2017 at which time TSI was slightly elevated at 144 (0-139)       Recent [facility] labs showed normal TSH.    2.  Multinodular thyroid.   Went for follow up ultrasound of thyroid at [facility]:      " CLINICAL HISTORY: Toxic nodular goiter     COMPARISON: 9/7/2017     FINDINGS: Thyroid isthmus heterogeneous unchanged measures 3.2 mm.     Right lobe of the thyroid mildly heterogeneous measures 5.1 x 1.3 x 1.5 cm and contains a  hypoechoic nodule in the mid pole 9.7 x 8.5 x 7.9 mm similar to the prior ultrasound.     Left lobe of the thyroid measures 4.2 x 1 x 1.5 cm mildly heterogeneous without focal lesion.       IMPRESSION: Although there is no significant interval change, ultrasound guided needle  aspiration/biopsy is again recommended for the hypoechoic nodule in the right mid lobe.      ***Electronically Signed ***  -----------------------------------------------  Osmani Arreguin MD   "         Will request that she return to [facility] for FNAB after authorization from her insurance company.    MERVIN in May.              Previous notes from [facility] appended below.  October 11, 2018            .            32 yo evaluated by Dr. Umana for multinodular thyroid with hyperthyroidism associated with elevated TSI. FNAB at [facility] reassuring. TSH has returned into normal range.            Other issues include elevated BMI            .            Plan: 1. Continued surveillance of thyroid nodules by means of history, physical and follow up ultrasound.            2. Continued monitroing of previous hyperthyroidism apparently due to Grave's disease. .            3. Further evaluation of elevated BMI.

## 2024-02-29 ENCOUNTER — APPOINTMENT (OUTPATIENT)
Dept: GASTROENTEROLOGY | Facility: CLINIC | Age: 39
End: 2024-02-29

## 2024-04-09 ENCOUNTER — APPOINTMENT (OUTPATIENT)
Dept: INTERNAL MEDICINE | Facility: CLINIC | Age: 39
End: 2024-04-09
Payer: MEDICAID

## 2024-04-09 PROCEDURE — 96372 THER/PROPH/DIAG INJ SC/IM: CPT

## 2024-04-09 RX ORDER — CYANOCOBALAMIN 1000 UG/ML
1000 INJECTION INTRAMUSCULAR; SUBCUTANEOUS
Qty: 0 | Refills: 0 | Status: COMPLETED | OUTPATIENT
Start: 2024-04-08

## 2024-04-23 ENCOUNTER — APPOINTMENT (OUTPATIENT)
Dept: FAMILY MEDICINE | Facility: CLINIC | Age: 39
End: 2024-04-23
Payer: MEDICAID

## 2024-04-23 VITALS
DIASTOLIC BLOOD PRESSURE: 86 MMHG | OXYGEN SATURATION: 98 % | WEIGHT: 220 LBS | TEMPERATURE: 98.1 F | HEIGHT: 58 IN | HEART RATE: 77 BPM | BODY MASS INDEX: 46.18 KG/M2 | SYSTOLIC BLOOD PRESSURE: 130 MMHG

## 2024-04-23 DIAGNOSIS — E55.9 VITAMIN D DEFICIENCY, UNSPECIFIED: ICD-10-CM

## 2024-04-23 DIAGNOSIS — Z02.1 ENCOUNTER FOR PRE-EMPLOYMENT EXAMINATION: ICD-10-CM

## 2024-04-23 PROCEDURE — 99214 OFFICE O/P EST MOD 30 MIN: CPT

## 2024-04-23 PROCEDURE — G2211 COMPLEX E/M VISIT ADD ON: CPT | Mod: NC,1L

## 2024-04-23 PROCEDURE — 36415 COLL VENOUS BLD VENIPUNCTURE: CPT

## 2024-04-23 NOTE — HEALTH RISK ASSESSMENT
[Yes] : Yes [Monthly or less (1 pt)] : Monthly or less (1 point) [1 or 2 (0 pts)] : 1 or 2 (0 points) [Any fall with injury in past year] : Patient reported fall with injury in the past year [1] : 1) Little interest or pleasure doing things for several days (1) [Never] : Never [Never (0 pts)] : Never (0 points) [0] : 2) Feeling down, depressed, or hopeless: Not at all (0) [PHQ-2 Negative - No further assessment needed] : PHQ-2 Negative - No further assessment needed [Audit-CScore] : 1 [de-identified] : hurt her back  [[id number]] : 1

## 2024-04-23 NOTE — HISTORY OF PRESENT ILLNESS
[FreeTextEntry8] : 40-year-old 38-year-old female presenting to have a work form completed that requires immune status including MMR V.  Patient had MMRV testing on 2019 and was immune.  She is has been on a diet and exercise and has been trying to lose weight but instead has been gaining weight.  She was prescribed Wegovy by Dr. Hellerman pending prior authorization.

## 2024-04-24 LAB
25(OH)D3 SERPL-MCNC: 24.6 NG/ML
CHOLEST SERPL-MCNC: 222 MG/DL
HDLC SERPL-MCNC: 49 MG/DL
LDLC SERPL CALC-MCNC: 143 MG/DL
MEV IGG FLD QL IA: >300 AU/ML
MEV IGG+IGM SER-IMP: POSITIVE
MUV AB SER-ACNC: POSITIVE
MUV IGG SER QL IA: 119 AU/ML
NONHDLC SERPL-MCNC: 173 MG/DL
RUBV IGG FLD-ACNC: 1.5 INDEX
RUBV IGG SER-IMP: POSITIVE
TRIGL SERPL-MCNC: 169 MG/DL
VZV AB TITR SER: POSITIVE
VZV IGG SER IF-ACNC: 1786 INDEX

## 2024-05-24 ENCOUNTER — APPOINTMENT (OUTPATIENT)
Dept: FAMILY MEDICINE | Facility: CLINIC | Age: 39
End: 2024-05-24
Payer: MEDICAID

## 2024-05-24 VITALS
SYSTOLIC BLOOD PRESSURE: 124 MMHG | OXYGEN SATURATION: 98 % | BODY MASS INDEX: 46.39 KG/M2 | DIASTOLIC BLOOD PRESSURE: 82 MMHG | HEART RATE: 72 BPM | WEIGHT: 221 LBS | HEIGHT: 58 IN | TEMPERATURE: 98.2 F

## 2024-05-24 DIAGNOSIS — R14.0 ABDOMINAL DISTENSION (GASEOUS): ICD-10-CM

## 2024-05-24 LAB
BILIRUB UR QL STRIP: NEGATIVE
CLARITY UR: CLEAR
COLLECTION METHOD: NORMAL
GLUCOSE UR-MCNC: NEGATIVE
HCG UR QL: 0.2 EU/DL
HCG UR QL: NEGATIVE
HGB UR QL STRIP.AUTO: NEGATIVE
KETONES UR-MCNC: NEGATIVE
LEUKOCYTE ESTERASE UR QL STRIP: NEGATIVE
NITRITE UR QL STRIP: NEGATIVE
PH UR STRIP: 5.5
PROT UR STRIP-MCNC: NEGATIVE
QUALITY CONTROL: YES
SP GR UR STRIP: 1.03

## 2024-05-24 PROCEDURE — G2211 COMPLEX E/M VISIT ADD ON: CPT | Mod: NC,1L

## 2024-05-24 PROCEDURE — 81003 URINALYSIS AUTO W/O SCOPE: CPT | Mod: QW

## 2024-05-24 PROCEDURE — 99214 OFFICE O/P EST MOD 30 MIN: CPT

## 2024-05-24 PROCEDURE — 36415 COLL VENOUS BLD VENIPUNCTURE: CPT

## 2024-05-24 RX ORDER — POLYETHYLENE GLYCOL 3350 17 G/17G
17 POWDER, FOR SOLUTION ORAL DAILY
Qty: 1 | Refills: 1 | Status: ACTIVE | COMMUNITY
Start: 2024-05-24 | End: 1900-01-01

## 2024-05-24 RX ADMIN — CYANOCOBALAMIN 0 MCG/ML: 1000 INJECTION INTRAMUSCULAR; SUBCUTANEOUS at 00:00

## 2024-05-28 LAB
ALBUMIN SERPL ELPH-MCNC: 3.9 G/DL
ALP BLD-CCNC: 91 U/L
ALT SERPL-CCNC: 19 U/L
ANION GAP SERPL CALC-SCNC: 15 MMOL/L
AST SERPL-CCNC: 17 U/L
BASOPHILS # BLD AUTO: 0.06 K/UL
BASOPHILS NFR BLD AUTO: 0.4 %
BILIRUB SERPL-MCNC: 0.4 MG/DL
BUN SERPL-MCNC: 13 MG/DL
CALCIUM SERPL-MCNC: 8.9 MG/DL
CHLORIDE SERPL-SCNC: 103 MMOL/L
CO2 SERPL-SCNC: 20 MMOL/L
CREAT SERPL-MCNC: 0.94 MG/DL
EGFR: 79 ML/MIN/1.73M2
EOSINOPHIL # BLD AUTO: 0.27 K/UL
EOSINOPHIL NFR BLD AUTO: 1.8 %
GLUCOSE SERPL-MCNC: 104 MG/DL
HCG SERPL-MCNC: <1 MIU/ML
HCT VFR BLD CALC: 43.5 %
HGB BLD-MCNC: 12.8 G/DL
IMM GRANULOCYTES NFR BLD AUTO: 0.8 %
LYMPHOCYTES # BLD AUTO: 3.71 K/UL
LYMPHOCYTES NFR BLD AUTO: 24.8 %
MAN DIFF?: NORMAL
MCHC RBC-ENTMCNC: 26.7 PG
MCHC RBC-ENTMCNC: 29.4 GM/DL
MCV RBC AUTO: 90.8 FL
MONOCYTES # BLD AUTO: 0.65 K/UL
MONOCYTES NFR BLD AUTO: 4.4 %
NEUTROPHILS # BLD AUTO: 10.13 K/UL
NEUTROPHILS NFR BLD AUTO: 67.8 %
PLATELET # BLD AUTO: 394 K/UL
POTASSIUM SERPL-SCNC: 4.5 MMOL/L
PROT SERPL-MCNC: 7.1 G/DL
RBC # BLD: 4.79 M/UL
RBC # FLD: 15.4 %
SODIUM SERPL-SCNC: 138 MMOL/L
WBC # FLD AUTO: 14.94 K/UL

## 2024-05-30 PROBLEM — R14.0 ABDOMINAL BLOATING: Status: ACTIVE | Noted: 2024-05-24

## 2024-05-30 NOTE — HEALTH RISK ASSESSMENT
[Yes] : Yes [Monthly or less (1 pt)] : Monthly or less (1 point) [1 or 2 (0 pts)] : 1 or 2 (0 points) [Any fall with injury in past year] : Patient reported fall with injury in the past year [1] : 2) Feeling down, depressed, or hopeless for several days (1) [PHQ-2 Positive] : PHQ-2 Positive [Never] : Never [Never (0 pts)] : Never (0 points) [No] : In the past 12 months have you used drugs other than those required for medical reasons? No [0] : 2) Feeling down, depressed, or hopeless: Not at all (0) [PHQ-2 Negative - No further assessment needed] : PHQ-2 Negative - No further assessment needed [Audit-CScore] : 1 [[id number]] : 1

## 2024-05-30 NOTE — HISTORY OF PRESENT ILLNESS
[FreeTextEntry8] : 38 year old female 11 month postpartum presenting with complaints of bloating, feeling uncomfortable and heavy. She states she noticed blood with bowel movement once or twice last month, last noticed on Sunday. Bowel movement has been normal this week with some constipation. Believes she might be pregnant.  LMP: 03/05/24

## 2024-05-30 NOTE — ADDENDUM
[FreeTextEntry1] :  Patient presented for Vitamin B12 (Cyanocobalamin) 1000 mcg/mL injection as ordered by Dr. Fitzgerald. Patient denied severe allergic reaction to prior vitamin B12 injections.  Manufactureer:  [employer] NDC:  1188-1179-54 Lot:  [id number] Exp:09/2025  Patient tolerated IM administration well in left deltoid. No immediate adverse reaction noted.  Patient instructed to make a one month follow up visit.   Delaney Chaves LPN

## 2024-06-04 ENCOUNTER — NON-APPOINTMENT (OUTPATIENT)
Age: 39
End: 2024-06-04

## 2024-06-04 ENCOUNTER — APPOINTMENT (OUTPATIENT)
Dept: FAMILY MEDICINE | Facility: CLINIC | Age: 39
End: 2024-06-04
Payer: MEDICAID

## 2024-06-04 ENCOUNTER — RESULT REVIEW (OUTPATIENT)
Age: 39
End: 2024-06-04

## 2024-06-04 VITALS
BODY MASS INDEX: 46.39 KG/M2 | HEIGHT: 58 IN | TEMPERATURE: 99 F | HEART RATE: 94 BPM | WEIGHT: 221 LBS | DIASTOLIC BLOOD PRESSURE: 82 MMHG | SYSTOLIC BLOOD PRESSURE: 136 MMHG | OXYGEN SATURATION: 98 %

## 2024-06-04 DIAGNOSIS — E78.5 HYPERLIPIDEMIA, UNSPECIFIED: ICD-10-CM

## 2024-06-04 DIAGNOSIS — R10.9 UNSPECIFIED ABDOMINAL PAIN: ICD-10-CM

## 2024-06-04 DIAGNOSIS — N91.2 AMENORRHEA, UNSPECIFIED: ICD-10-CM

## 2024-06-04 PROCEDURE — G2211 COMPLEX E/M VISIT ADD ON: CPT | Mod: NC

## 2024-06-04 PROCEDURE — 99214 OFFICE O/P EST MOD 30 MIN: CPT

## 2024-06-11 PROBLEM — N91.2 AMENORRHEA: Status: ACTIVE | Noted: 2022-02-11

## 2024-06-11 PROBLEM — E78.5 HYPERLIPIDEMIA: Status: ACTIVE | Noted: 2024-04-23

## 2024-06-11 PROBLEM — R10.9 ABDOMINAL PAIN, UNSPECIFIED ABDOMINAL LOCATION: Status: ACTIVE | Noted: 2020-05-29

## 2024-06-11 NOTE — HEALTH RISK ASSESSMENT
[Never] : Never [0] : 2) Feeling down, depressed, or hopeless: Not at all (0) [PHQ-2 Negative - No further assessment needed] : PHQ-2 Negative - No further assessment needed [[id number]] : 0

## 2024-06-11 NOTE — HISTORY OF PRESENT ILLNESS
[FreeTextEntry1] : Foll [de-identified] : 38-year-old female 11 month postpartum presenting for a follow up and with complaints of continued bloating. Patient believes she is pregnant as her symptoms are very similar to when she was pregnant.

## 2024-09-27 ENCOUNTER — APPOINTMENT (OUTPATIENT)
Dept: INTERNAL MEDICINE | Facility: CLINIC | Age: 39
End: 2024-09-27
Payer: MEDICAID

## 2024-09-27 PROCEDURE — 96372 THER/PROPH/DIAG INJ SC/IM: CPT

## 2024-09-27 RX ORDER — CYANOCOBALAMIN 1000 UG/ML
1000 INJECTION INTRAMUSCULAR; SUBCUTANEOUS
Qty: 0 | Refills: 0 | Status: COMPLETED | OUTPATIENT
Start: 2024-09-27

## 2024-10-16 ENCOUNTER — APPOINTMENT (OUTPATIENT)
Dept: ENDOCRINOLOGY | Facility: CLINIC | Age: 39
End: 2024-10-16
Payer: MEDICAID

## 2024-10-16 VITALS
WEIGHT: 216 LBS | HEIGHT: 58 IN | HEART RATE: 92 BPM | OXYGEN SATURATION: 97 % | SYSTOLIC BLOOD PRESSURE: 124 MMHG | DIASTOLIC BLOOD PRESSURE: 68 MMHG | BODY MASS INDEX: 45.34 KG/M2

## 2024-10-16 DIAGNOSIS — C73 MALIGNANT NEOPLASM OF THYROID GLAND: ICD-10-CM

## 2024-10-16 PROCEDURE — 99214 OFFICE O/P EST MOD 30 MIN: CPT

## 2024-10-19 ENCOUNTER — RESULT REVIEW (OUTPATIENT)
Age: 39
End: 2024-10-19

## 2024-11-19 ENCOUNTER — APPOINTMENT (OUTPATIENT)
Dept: ENDOCRINOLOGY | Facility: CLINIC | Age: 39
End: 2024-11-19
Payer: MEDICAID

## 2024-11-19 DIAGNOSIS — C73 MALIGNANT NEOPLASM OF THYROID GLAND: ICD-10-CM

## 2024-11-19 DIAGNOSIS — E66.01 MORBID (SEVERE) OBESITY DUE TO EXCESS CALORIES: ICD-10-CM

## 2024-11-19 PROCEDURE — 99214 OFFICE O/P EST MOD 30 MIN: CPT

## 2024-11-19 RX ORDER — DEXAMETHASONE 1 MG/1
1 TABLET ORAL
Qty: 1 | Refills: 0 | Status: ACTIVE | COMMUNITY
Start: 2024-11-19 | End: 1900-01-01

## 2025-01-09 ENCOUNTER — APPOINTMENT (OUTPATIENT)
Dept: INTERNAL MEDICINE | Facility: CLINIC | Age: 40
End: 2025-01-09

## 2025-01-10 ENCOUNTER — APPOINTMENT (OUTPATIENT)
Dept: INTERNAL MEDICINE | Facility: CLINIC | Age: 40
End: 2025-01-10
Payer: MEDICAID

## 2025-01-10 PROCEDURE — 96372 THER/PROPH/DIAG INJ SC/IM: CPT

## 2025-01-10 RX ORDER — CYANOCOBALAMIN 1000 UG/ML
1000 INJECTION, SOLUTION INTRAMUSCULAR; SUBCUTANEOUS
Qty: 0 | Refills: 0 | Status: COMPLETED | OUTPATIENT
Start: 2025-01-07

## 2025-03-05 ENCOUNTER — APPOINTMENT (OUTPATIENT)
Dept: FAMILY MEDICINE | Facility: CLINIC | Age: 40
End: 2025-03-05

## 2025-03-05 VITALS
SYSTOLIC BLOOD PRESSURE: 118 MMHG | WEIGHT: 210 LBS | HEART RATE: 83 BPM | BODY MASS INDEX: 44.08 KG/M2 | TEMPERATURE: 98.1 F | HEIGHT: 58 IN | DIASTOLIC BLOOD PRESSURE: 70 MMHG | OXYGEN SATURATION: 98 %

## 2025-03-05 DIAGNOSIS — K62.5 HEMORRHAGE OF ANUS AND RECTUM: ICD-10-CM

## 2025-03-05 DIAGNOSIS — Z01.84 ENCOUNTER FOR ANTIBODY RESPONSE EXAMINATION: ICD-10-CM

## 2025-03-05 DIAGNOSIS — Z11.1 ENCOUNTER FOR SCREENING FOR RESPIRATORY TUBERCULOSIS: ICD-10-CM

## 2025-03-05 PROCEDURE — 36415 COLL VENOUS BLD VENIPUNCTURE: CPT

## 2025-03-05 PROCEDURE — 99215 OFFICE O/P EST HI 40 MIN: CPT | Mod: 25

## 2025-03-05 PROCEDURE — 96372 THER/PROPH/DIAG INJ SC/IM: CPT

## 2025-03-05 RX ORDER — CYANOCOBALAMIN 1000 UG/ML
1000 INJECTION, SOLUTION INTRAMUSCULAR; SUBCUTANEOUS
Qty: 0 | Refills: 0 | Status: COMPLETED | OUTPATIENT
Start: 2025-03-05

## 2025-03-05 RX ADMIN — CYANOCOBALAMIN 0 MCG/ML: 1000 INJECTION, SOLUTION INTRAMUSCULAR at 00:00

## 2025-03-11 LAB
25(OH)D3 SERPL-MCNC: 16 NG/ML
ALBUMIN SERPL ELPH-MCNC: 4 G/DL
ALP BLD-CCNC: 97 U/L
ALT SERPL-CCNC: 19 U/L
ANION GAP SERPL CALC-SCNC: 12 MMOL/L
AST SERPL-CCNC: 14 U/L
BASOPHILS # BLD AUTO: 0.04 K/UL
BASOPHILS NFR BLD AUTO: 0.3 %
BILIRUB SERPL-MCNC: 0.4 MG/DL
BUN SERPL-MCNC: 12 MG/DL
CALCIUM SERPL-MCNC: 8.5 MG/DL
CHLORIDE SERPL-SCNC: 103 MMOL/L
CO2 SERPL-SCNC: 23 MMOL/L
CREAT SERPL-MCNC: 0.99 MG/DL
EGFRCR SERPLBLD CKD-EPI 2021: 73 ML/MIN/1.73M2
EOSINOPHIL # BLD AUTO: 0.31 K/UL
EOSINOPHIL NFR BLD AUTO: 2.5 %
FERRITIN SERPL-MCNC: 44 NG/ML
FOLATE SERPL-MCNC: 3.3 NG/ML
GLUCOSE SERPL-MCNC: 96 MG/DL
HBV CORE IGG+IGM SER QL: NONREACTIVE
HBV SURFACE AB SER QL: NONREACTIVE
HBV SURFACE AG SER QL: NONREACTIVE
HCT VFR BLD CALC: 43.7 %
HGB BLD-MCNC: 13.4 G/DL
IMM GRANULOCYTES NFR BLD AUTO: 0.5 %
IRON SATN MFR SERPL: 12 %
IRON SERPL-MCNC: 45 UG/DL
LYMPHOCYTES # BLD AUTO: 3.24 K/UL
LYMPHOCYTES NFR BLD AUTO: 26 %
M TB IFN-G BLD-IMP: NEGATIVE
MAN DIFF?: NORMAL
MCHC RBC-ENTMCNC: 26.7 PG
MCHC RBC-ENTMCNC: 30.7 G/DL
MCV RBC AUTO: 87.2 FL
MONOCYTES # BLD AUTO: 0.54 K/UL
MONOCYTES NFR BLD AUTO: 4.3 %
NEUTROPHILS # BLD AUTO: 8.27 K/UL
NEUTROPHILS NFR BLD AUTO: 66.4 %
PLATELET # BLD AUTO: 378 K/UL
POTASSIUM SERPL-SCNC: 4.2 MMOL/L
PROT SERPL-MCNC: 7 G/DL
QUANTIFERON TB PLUS MITOGEN MINUS NIL: >10 IU/ML
QUANTIFERON TB PLUS NIL: 0.05 IU/ML
QUANTIFERON TB PLUS TB1 MINUS NIL: 0 IU/ML
QUANTIFERON TB PLUS TB2 MINUS NIL: 0 IU/ML
RBC # BLD: 5.01 M/UL
RBC # FLD: 15.9 %
SODIUM SERPL-SCNC: 138 MMOL/L
TIBC SERPL-MCNC: 364 UG/DL
UIBC SERPL-MCNC: 319 UG/DL
VIT B12 SERPL-MCNC: 384 PG/ML
WBC # FLD AUTO: 12.46 K/UL

## 2025-03-13 ENCOUNTER — APPOINTMENT (OUTPATIENT)
Dept: INTERNAL MEDICINE | Facility: CLINIC | Age: 40
End: 2025-03-13
Payer: MEDICAID

## 2025-03-13 PROCEDURE — 90739 HEPB VACC 2/4 DOSE ADULT IM: CPT

## 2025-03-13 PROCEDURE — G0010: CPT

## 2025-03-14 RX ORDER — FOLIC ACID 1 MG/1
1 TABLET ORAL DAILY
Qty: 90 | Refills: 0 | Status: ACTIVE | COMMUNITY
Start: 2025-03-14 | End: 1900-01-01

## 2025-04-17 ENCOUNTER — MED ADMIN CHARGE (OUTPATIENT)
Age: 40
End: 2025-04-17

## 2025-04-17 ENCOUNTER — APPOINTMENT (OUTPATIENT)
Dept: INTERNAL MEDICINE | Facility: CLINIC | Age: 40
End: 2025-04-17
Payer: MEDICAID

## 2025-04-17 DIAGNOSIS — E53.8 DEFICIENCY OF OTHER SPECIFIED B GROUP VITAMINS: ICD-10-CM

## 2025-04-17 PROCEDURE — 96372 THER/PROPH/DIAG INJ SC/IM: CPT

## 2025-04-17 PROCEDURE — 90739 HEPB VACC 2/4 DOSE ADULT IM: CPT

## 2025-04-17 PROCEDURE — G0010: CPT

## 2025-04-17 RX ORDER — CYANOCOBALAMIN 1000 UG/ML
1000 INJECTION, SOLUTION INTRAMUSCULAR; SUBCUTANEOUS
Qty: 0 | Refills: 0 | Status: COMPLETED | OUTPATIENT
Start: 2025-04-17

## 2025-04-17 RX ADMIN — CYANOCOBALAMIN 0 MCG/ML: 1000 INJECTION, SOLUTION INTRAMUSCULAR at 00:00

## 2025-04-21 RX ORDER — CYANOCOBALAMIN 1000 UG/ML
1000 INJECTION, SOLUTION INTRAMUSCULAR; SUBCUTANEOUS
Qty: 1 | Refills: 0 | Status: ACTIVE | COMMUNITY
Start: 2025-04-21 | End: 1900-01-01

## 2025-04-24 ENCOUNTER — APPOINTMENT (OUTPATIENT)
Dept: GASTROENTEROLOGY | Facility: CLINIC | Age: 40
End: 2025-04-24

## 2025-05-28 ENCOUNTER — APPOINTMENT (OUTPATIENT)
Dept: BARIATRICS/WEIGHT MGMT | Facility: CLINIC | Age: 40
End: 2025-05-28

## 2025-05-28 RX ORDER — SYRINGE W-NEEDLE,DISPOSAB,3 ML 25GX1"
25G X 5/8" SYRINGE, EMPTY DISPOSABLE MISCELLANEOUS
Qty: 1 | Refills: 0 | Status: ACTIVE | COMMUNITY
Start: 2025-05-28 | End: 1900-01-01

## 2025-09-05 ENCOUNTER — APPOINTMENT (OUTPATIENT)
Dept: ENDOCRINOLOGY | Facility: CLINIC | Age: 40
End: 2025-09-05
Payer: MEDICAID

## 2025-09-05 DIAGNOSIS — C73 MALIGNANT NEOPLASM OF THYROID GLAND: ICD-10-CM

## 2025-09-05 DIAGNOSIS — E04.1 NONTOXIC SINGLE THYROID NODULE: ICD-10-CM

## 2025-09-05 PROCEDURE — 99215 OFFICE O/P EST HI 40 MIN: CPT | Mod: 95

## 2025-09-08 ENCOUNTER — RESULT REVIEW (OUTPATIENT)
Age: 40
End: 2025-09-08

## 2025-09-19 ENCOUNTER — APPOINTMENT (OUTPATIENT)
Dept: FAMILY MEDICINE | Facility: CLINIC | Age: 40
End: 2025-09-19

## 2025-09-19 ENCOUNTER — LABORATORY RESULT (OUTPATIENT)
Age: 40
End: 2025-09-19

## 2025-09-19 VITALS
HEART RATE: 103 BPM | TEMPERATURE: 98.1 F | DIASTOLIC BLOOD PRESSURE: 82 MMHG | HEIGHT: 58 IN | BODY MASS INDEX: 44.29 KG/M2 | WEIGHT: 211 LBS | SYSTOLIC BLOOD PRESSURE: 124 MMHG | OXYGEN SATURATION: 98 %

## 2025-09-19 DIAGNOSIS — M54.9 DORSALGIA, UNSPECIFIED: ICD-10-CM

## 2025-09-19 DIAGNOSIS — E53.8 DEFICIENCY OF OTHER SPECIFIED B GROUP VITAMINS: ICD-10-CM

## 2025-09-19 DIAGNOSIS — Z00.00 ENCOUNTER FOR GENERAL ADULT MEDICAL EXAMINATION W/OUT ABNORMAL FINDINGS: ICD-10-CM

## 2025-09-19 DIAGNOSIS — K59.09 OTHER CONSTIPATION: ICD-10-CM

## 2025-09-19 DIAGNOSIS — R53.83 OTHER FATIGUE: ICD-10-CM

## 2025-09-19 DIAGNOSIS — G89.29 DORSALGIA, UNSPECIFIED: ICD-10-CM

## 2025-09-19 DIAGNOSIS — E66.01 MORBID (SEVERE) OBESITY DUE TO EXCESS CALORIES: ICD-10-CM

## 2025-09-19 DIAGNOSIS — C73 MALIGNANT NEOPLASM OF THYROID GLAND: ICD-10-CM

## 2025-09-19 DIAGNOSIS — E78.5 HYPERLIPIDEMIA, UNSPECIFIED: ICD-10-CM

## 2025-09-19 DIAGNOSIS — E78.2 MIXED HYPERLIPIDEMIA: ICD-10-CM

## 2025-09-19 DIAGNOSIS — E05.90 THYROTOXICOSIS, UNSPECIFIED W/OUT THYROTOXIC CRISIS OR STORM: ICD-10-CM

## 2025-09-25 LAB
25(OH)D3 SERPL-MCNC: 14 NG/ML
ALBUMIN SERPL ELPH-MCNC: 3.8 G/DL
ALP BLD-CCNC: 97 U/L
ALT SERPL-CCNC: 18 U/L
ANION GAP SERPL CALC-SCNC: 14 MMOL/L
APPEARANCE: CLEAR
AST SERPL-CCNC: 15 U/L
BASOPHILS # BLD AUTO: 0.04 K/UL
BASOPHILS NFR BLD AUTO: 0.3 %
BILIRUB SERPL-MCNC: 0.5 MG/DL
BILIRUBIN URINE: NEGATIVE
BLOOD URINE: NEGATIVE
BUN SERPL-MCNC: 16 MG/DL
CALCIUM SERPL-MCNC: 9 MG/DL
CHLORIDE SERPL-SCNC: 104 MMOL/L
CHOLEST SERPL-MCNC: 224 MG/DL
CO2 SERPL-SCNC: 23 MMOL/L
COLOR: NORMAL
CREAT SERPL-MCNC: 1.1 MG/DL
EGFRCR SERPLBLD CKD-EPI 2021: 64 ML/MIN/1.73M2
EOSINOPHIL # BLD AUTO: 0.22 K/UL
EOSINOPHIL NFR BLD AUTO: 1.7 %
ESTIMATED AVERAGE GLUCOSE: 114 MG/DL
FERRITIN SERPL-MCNC: 43 NG/ML
FOLATE SERPL-MCNC: 5 NG/ML
GLUCOSE QUALITATIVE U: NEGATIVE MG/DL
GLUCOSE SERPL-MCNC: 112 MG/DL
HBA1C MFR BLD HPLC: 5.6 %
HCT VFR BLD CALC: 42.6 %
HDLC SERPL-MCNC: 45 MG/DL
HGB BLD-MCNC: 13.1 G/DL
IMM GRANULOCYTES NFR BLD AUTO: 0.6 %
IRON SATN MFR SERPL: 14 %
IRON SERPL-MCNC: 48 UG/DL
KETONES URINE: ABNORMAL MG/DL
LDLC SERPL-MCNC: 124 MG/DL
LEUKOCYTE ESTERASE URINE: ABNORMAL
LYMPHOCYTES # BLD AUTO: 3.19 K/UL
LYMPHOCYTES NFR BLD AUTO: 24.2 %
MAN DIFF?: NORMAL
MCHC RBC-ENTMCNC: 27.1 PG
MCHC RBC-ENTMCNC: 30.8 G/DL
MCV RBC AUTO: 88.2 FL
MONOCYTES # BLD AUTO: 0.55 K/UL
MONOCYTES NFR BLD AUTO: 4.2 %
NEUTROPHILS # BLD AUTO: 9.11 K/UL
NEUTROPHILS NFR BLD AUTO: 69 %
NITRITE URINE: NEGATIVE
NONHDLC SERPL-MCNC: 179 MG/DL
PH URINE: 5.5
PLATELET # BLD AUTO: 361 K/UL
POTASSIUM SERPL-SCNC: 4.3 MMOL/L
PROT SERPL-MCNC: 6.9 G/DL
PROTEIN URINE: 30 MG/DL
RBC # BLD: 4.83 M/UL
RBC # FLD: 15.6 %
SODIUM SERPL-SCNC: 141 MMOL/L
SPECIFIC GRAVITY URINE: >1.03
TIBC SERPL-MCNC: 345 UG/DL
TRIGL SERPL-MCNC: 312 MG/DL
UIBC SERPL-MCNC: 297 UG/DL
UROBILINOGEN URINE: 0.2 MG/DL
VIT B12 SERPL-MCNC: 723 PG/ML
WBC # FLD AUTO: 13.19 K/UL